# Patient Record
Sex: FEMALE | Race: WHITE | Employment: FULL TIME | URBAN - METROPOLITAN AREA
[De-identification: names, ages, dates, MRNs, and addresses within clinical notes are randomized per-mention and may not be internally consistent; named-entity substitution may affect disease eponyms.]

---

## 2017-05-22 ENCOUNTER — HOSPITAL ENCOUNTER (OUTPATIENT)
Dept: MAMMOGRAPHY | Age: 57
Discharge: HOME | End: 2017-05-22
Attending: FAMILY MEDICINE
Payer: COMMERCIAL

## 2017-05-22 DIAGNOSIS — Z12.31 VISIT FOR SCREENING MAMMOGRAM: ICD-10-CM

## 2017-05-22 PROCEDURE — 77067 SCR MAMMO BI INCL CAD: CPT

## 2017-09-16 ENCOUNTER — HOSPITAL ENCOUNTER (EMERGENCY)
Age: 57
Discharge: HOME | End: 2017-09-16
Attending: EMERGENCY MEDICINE
Payer: COMMERCIAL

## 2017-09-16 ENCOUNTER — APPOINTMENT (OUTPATIENT)
Dept: CT IMAGING | Age: 57
End: 2017-09-16
Payer: COMMERCIAL

## 2017-09-16 VITALS
HEART RATE: 64 BPM | OXYGEN SATURATION: 97 % | DIASTOLIC BLOOD PRESSURE: 67 MMHG | RESPIRATION RATE: 20 BRPM | BODY MASS INDEX: 19.93 KG/M2 | HEIGHT: 66 IN | TEMPERATURE: 98.1 F | SYSTOLIC BLOOD PRESSURE: 112 MMHG | WEIGHT: 124 LBS

## 2017-09-16 DIAGNOSIS — R20.2 PARESTHESIA: Primary | ICD-10-CM

## 2017-09-16 DIAGNOSIS — R51.9 NONINTRACTABLE HEADACHE, UNSPECIFIED CHRONICITY PATTERN, UNSPECIFIED HEADACHE TYPE: ICD-10-CM

## 2017-09-16 LAB
ALBUMIN SERPL-MCNC: 4 G/DL (ref 3.5–5)
ALBUMIN/GLOB SERPL: 1.1 {RATIO} (ref 1.2–3.5)
ALP SERPL-CCNC: 82 U/L (ref 50–136)
ALT SERPL-CCNC: 32 U/L (ref 12–65)
ANION GAP SERPL CALC-SCNC: 9 MMOL/L (ref 7–16)
AST SERPL-CCNC: 24 U/L (ref 15–37)
ATRIAL RATE: 61 BPM
BASOPHILS # BLD: 0 K/UL (ref 0–0.2)
BASOPHILS NFR BLD: 1 % (ref 0–2)
BILIRUB SERPL-MCNC: 0.8 MG/DL (ref 0.2–1.1)
BUN SERPL-MCNC: 15 MG/DL (ref 6–23)
CALCIUM SERPL-MCNC: 9.4 MG/DL (ref 8.3–10.4)
CALCULATED P AXIS, ECG09: 60 DEGREES
CALCULATED R AXIS, ECG10: -62 DEGREES
CALCULATED T AXIS, ECG11: 75 DEGREES
CHLORIDE SERPL-SCNC: 106 MMOL/L (ref 98–107)
CO2 SERPL-SCNC: 29 MMOL/L (ref 21–32)
CREAT SERPL-MCNC: 0.58 MG/DL (ref 0.6–1)
DIAGNOSIS, 93000: NORMAL
DIFFERENTIAL METHOD BLD: ABNORMAL
EOSINOPHIL # BLD: 0.1 K/UL (ref 0–0.8)
EOSINOPHIL NFR BLD: 1 % (ref 0.5–7.8)
ERYTHROCYTE [DISTWIDTH] IN BLOOD BY AUTOMATED COUNT: 13.4 % (ref 11.9–14.6)
GLOBULIN SER CALC-MCNC: 3.8 G/DL (ref 2.3–3.5)
GLUCOSE SERPL-MCNC: 100 MG/DL (ref 65–100)
HCT VFR BLD AUTO: 40.3 % (ref 35.8–46.3)
HGB BLD-MCNC: 14.6 G/DL (ref 11.7–15.4)
IMM GRANULOCYTES # BLD: 0 K/UL (ref 0–0.5)
IMM GRANULOCYTES NFR BLD: 0 % (ref 0–5)
LYMPHOCYTES # BLD: 1.7 K/UL (ref 0.5–4.6)
LYMPHOCYTES NFR BLD: 29 % (ref 13–44)
MCH RBC QN AUTO: 32.4 PG (ref 26.1–32.9)
MCHC RBC AUTO-ENTMCNC: 36.2 G/DL (ref 31.4–35)
MCV RBC AUTO: 89.4 FL (ref 79.6–97.8)
MONOCYTES # BLD: 0.3 K/UL (ref 0.1–1.3)
MONOCYTES NFR BLD: 5 % (ref 4–12)
NEUTS SEG # BLD: 3.8 K/UL (ref 1.7–8.2)
NEUTS SEG NFR BLD: 64 % (ref 43–78)
P-R INTERVAL, ECG05: 138 MS
PLATELET # BLD AUTO: 231 K/UL (ref 150–450)
PMV BLD AUTO: 9.7 FL (ref 10.8–14.1)
POTASSIUM SERPL-SCNC: 4.1 MMOL/L (ref 3.5–5.1)
PROT SERPL-MCNC: 7.8 G/DL (ref 6.3–8.2)
Q-T INTERVAL, ECG07: 434 MS
QRS DURATION, ECG06: 86 MS
QTC CALCULATION (BEZET), ECG08: 436 MS
RBC # BLD AUTO: 4.51 M/UL (ref 4.05–5.25)
SODIUM SERPL-SCNC: 144 MMOL/L (ref 136–145)
TROPONIN I SERPL-MCNC: <0.02 NG/ML (ref 0.02–0.05)
TSH SERPL DL<=0.005 MIU/L-ACNC: 0.88 UIU/ML (ref 0.36–3.74)
VENTRICULAR RATE, ECG03: 61 BPM
WBC # BLD AUTO: 5.9 K/UL (ref 4.3–11.1)

## 2017-09-16 PROCEDURE — 80053 COMPREHEN METABOLIC PANEL: CPT | Performed by: EMERGENCY MEDICINE

## 2017-09-16 PROCEDURE — 96361 HYDRATE IV INFUSION ADD-ON: CPT | Performed by: EMERGENCY MEDICINE

## 2017-09-16 PROCEDURE — 85025 COMPLETE CBC W/AUTO DIFF WBC: CPT | Performed by: EMERGENCY MEDICINE

## 2017-09-16 PROCEDURE — 84484 ASSAY OF TROPONIN QUANT: CPT | Performed by: EMERGENCY MEDICINE

## 2017-09-16 PROCEDURE — 93005 ELECTROCARDIOGRAM TRACING: CPT | Performed by: EMERGENCY MEDICINE

## 2017-09-16 PROCEDURE — 74011250636 HC RX REV CODE- 250/636: Performed by: EMERGENCY MEDICINE

## 2017-09-16 PROCEDURE — 96375 TX/PRO/DX INJ NEW DRUG ADDON: CPT | Performed by: EMERGENCY MEDICINE

## 2017-09-16 PROCEDURE — 84443 ASSAY THYROID STIM HORMONE: CPT | Performed by: EMERGENCY MEDICINE

## 2017-09-16 PROCEDURE — 96374 THER/PROPH/DIAG INJ IV PUSH: CPT | Performed by: EMERGENCY MEDICINE

## 2017-09-16 PROCEDURE — 70450 CT HEAD/BRAIN W/O DYE: CPT

## 2017-09-16 PROCEDURE — 99285 EMERGENCY DEPT VISIT HI MDM: CPT | Performed by: EMERGENCY MEDICINE

## 2017-09-16 RX ORDER — METOCLOPRAMIDE HYDROCHLORIDE 5 MG/ML
10 INJECTION INTRAMUSCULAR; INTRAVENOUS
Status: COMPLETED | OUTPATIENT
Start: 2017-09-16 | End: 2017-09-16

## 2017-09-16 RX ORDER — SODIUM CHLORIDE 9 MG/ML
1000 INJECTION, SOLUTION INTRAVENOUS ONCE
Status: COMPLETED | OUTPATIENT
Start: 2017-09-16 | End: 2017-09-16

## 2017-09-16 RX ORDER — KETOROLAC TROMETHAMINE 30 MG/ML
15 INJECTION, SOLUTION INTRAMUSCULAR; INTRAVENOUS
Status: COMPLETED | OUTPATIENT
Start: 2017-09-16 | End: 2017-09-16

## 2017-09-16 RX ORDER — DIPHENHYDRAMINE HYDROCHLORIDE 50 MG/ML
25 INJECTION, SOLUTION INTRAMUSCULAR; INTRAVENOUS
Status: COMPLETED | OUTPATIENT
Start: 2017-09-16 | End: 2017-09-16

## 2017-09-16 RX ADMIN — DIPHENHYDRAMINE HYDROCHLORIDE 25 MG: 50 INJECTION, SOLUTION INTRAMUSCULAR; INTRAVENOUS at 16:02

## 2017-09-16 RX ADMIN — METOCLOPRAMIDE 10 MG: 5 INJECTION, SOLUTION INTRAMUSCULAR; INTRAVENOUS at 16:02

## 2017-09-16 RX ADMIN — KETOROLAC TROMETHAMINE 15 MG: 30 INJECTION, SOLUTION INTRAMUSCULAR at 16:01

## 2017-09-16 RX ADMIN — SODIUM CHLORIDE 1000 ML: 900 INJECTION, SOLUTION INTRAVENOUS at 16:01

## 2017-09-16 NOTE — ED NOTES
Rosey Benavides is a 56 y.o. female here for headache, dizziness, decreased taste sensation. Rapid assessment performed. --- Orders were placed. --- Patient will be roomed. I have seen and briefly evaluated the patient in triage in order to expedite their workup and treatment as defined by the department policy and procedure. Their care will be completed in the emergency department by another provider. The work up will not be complete until this further evaluation is completed by another provider.      Signed By: LUIS Shaver     September 16, 2017

## 2017-09-16 NOTE — DISCHARGE INSTRUCTIONS
Numbness and Tingling: Care Instructions  Your Care Instructions  Many things can cause numbness or tingling. Swelling may put pressure on a nerve. This could cause you to lose feeling or have a pins-and-needles sensation on part of your body. Nerves may be damaged from trauma, toxins, or diseases, such as diabetes or multiple sclerosis (MS). Sometimes, though, the cause is not clear. If there is no clear reason for your symptoms, and you are not having any other symptoms, your doctor may suggest watching and waiting for a while to see if the numbness or tingling goes away on its own. Your doctor may want you to have blood or nerve tests to find the cause of your symptoms. Follow-up care is a key part of your treatment and safety. Be sure to make and go to all appointments, and call your doctor if you are having problems. It's also a good idea to know your test results and keep a list of the medicines you take. How can you care for yourself at home? · If your doctor prescribes medicine, take it exactly as directed. Call your doctor if you think you are having a problem with your medicine. · If you have any swelling, put ice or a cold pack on the area for 10 to 20 minutes at a time. Put a thin cloth between the ice and your skin. When should you call for help? Call 911 anytime you think you may need emergency care. For example, call if:  · You have weakness, numbness, or tingling in both legs. · You lose bowel or bladder control. · You have symptoms of a stroke. These may include:  ¨ Sudden numbness, tingling, weakness, or loss of movement in your face, arm, or leg, especially on only one side of your body. ¨ Sudden vision changes. ¨ Sudden trouble speaking. ¨ Sudden confusion or trouble understanding simple statements. ¨ Sudden problems with walking or balance. ¨ A sudden, severe headache that is different from past headaches.   Watch closely for changes in your health, and be sure to contact your doctor if you have any problems, or if:  · You do not get better as expected. Where can you learn more? Go to http://betty-evelin.info/. Enter H948 in the search box to learn more about \"Numbness and Tingling: Care Instructions. \"  Current as of: October 14, 2016  Content Version: 11.3  © 7949-4305 "[employer]. Care instructions adapted under license by [employer] (which disclaims liability or warranty for this information). If you have questions about a medical condition or this instruction, always ask your healthcare professional. [place] [address] any warranty or liability for your use of this information.

## 2017-09-16 NOTE — ED PROVIDER NOTES
Patient is a 56 y.o. female presenting with dizziness and headaches. The history is provided by the patient. Dizziness   This is a new problem. The current episode started more than 1 week ago. The problem has not changed since onset. There was no focality noted. Pertinent negatives include no focal weakness, no slurred speech, no speech difficulty, no visual change, no mental status change and no unresponsiveness. There has been no fever. Associated symptoms include headaches. Pertinent negatives include no chest pain. Associated medical issues do not include trauma or seizures. Headache    Associated symptoms include dizziness. Pertinent negatives include no fever and no visual change. No past medical history on file. Past Surgical History:   Procedure Laterality Date    VIDA BIOPSY BREAST STEREOTACTIC Left 2014         Family History:   Problem Relation Age of Onset    Breast Cancer Mother 79    Breast Cancer Sister 58       Social History     Social History    Marital status:      Spouse name: N/A    Number of children: N/A    Years of education: N/A     Occupational History    Not on file. Social History Main Topics    Smoking status: Not on file    Smokeless tobacco: Not on file    Alcohol use Not on file    Drug use: Not on file    Sexual activity: Not on file     Other Topics Concern    Not on file     Social History Narrative         ALLERGIES: Review of patient's allergies indicates no known allergies. Review of Systems   Constitutional: Negative. Negative for chills and fever. HENT: Positive for hearing loss. Negative for congestion, ear pain, postnasal drip and rhinorrhea. Eyes: Negative for pain and visual disturbance. Respiratory: Negative for cough and wheezing. Cardiovascular: Negative for chest pain and leg swelling. Gastrointestinal: Negative. Negative for abdominal distention and abdominal pain. Endocrine: Negative.   Negative for polydipsia, polyphagia and polyuria. Genitourinary: Negative. Negative for difficulty urinating, flank pain and frequency. Musculoskeletal: Negative. Negative for arthralgias and myalgias. Skin: Negative. Neurological: Positive for dizziness and headaches. Negative for focal weakness and speech difficulty. Hematological: Negative. Vitals:    09/16/17 1406 09/16/17 1430 09/16/17 1454 09/16/17 1545   BP: 114/83  123/56    Pulse: 78 64 60 60   Resp: 16 24  (!) 33   Temp: 98.9 °F (37.2 °C)      SpO2: 95%  97% 96%   Weight: 56.2 kg (124 lb)      Height: 5' 6\" (1.676 m)               Physical Exam   Constitutional: She is oriented to person, place, and time. She appears well-developed and well-nourished. Non-toxic appearance. She does not have a sickly appearance. She does not appear ill. No distress. HENT:   Head: Normocephalic and atraumatic. Cardiovascular: Intact distal pulses. Pulmonary/Chest: Effort normal.   Abdominal: Soft. Neurological: She is alert and oriented to person, place, and time. Skin: Skin is warm and dry. Psychiatric: She has a normal mood and affect. Her behavior is normal.   Nursing note and vitals reviewed. MDM  Number of Diagnoses or Management Options  Nonintractable headache, unspecified chronicity pattern, unspecified headache type: new and requires workup  Paresthesia: new and requires workup  Diagnosis management comments: Patient has no focal neurologic signs in the ER. Symptoms present for 14 days but she states she has not contacted her primary care physician since she is not due for a physical anytime soon. Patient states she came to the ER only because the symptoms have not resolved and she was concerned why she's had headaches, numbness in her throat, and bilateral upper extremity tingling in her fingertips for that period of time.   Patient states that she recently tapered herself off of 4-5 years of chronic benzodiazepine therapy and wasn't sure if that could be related. Denies fever or chills. Denies other complaints currently. Severity and other in the room states that she does take a lot of over-the-counter supplementations but does not have a list currently. Patient states she does take Diovan and Synthroid chronically otherwise no other medications are prescribed.            Amount and/or Complexity of Data Reviewed  Clinical lab tests: ordered and reviewed (Results for orders placed or performed during the hospital encounter of 09/16/17  -CBC WITH AUTOMATED DIFF       Result                                            Value                         Ref Range                       WBC                                               5.9                           4.3 - 11.1 K/uL                 RBC                                               4.51                          4.05 - 5.25 M/uL                HGB                                               14.6                          11.7 - 15.4 g/dL                HCT                                               40.3                          35.8 - 46.3 %                   MCV                                               89.4                          79.6 - 97.8 FL                  MCH                                               32.4                          26.1 - 32.9 PG                  MCHC                                              36.2 (H)                      31.4 - 35.0 g/dL                RDW                                               13.4                          11.9 - 14.6 %                   PLATELET                                          231                           150 - 450 K/uL                  MPV                                               9.7 (L)                       10.8 - 14.1 FL                  DF                                                AUTOMATED                                                     NEUTROPHILS                                       64 43 - 78 %                       LYMPHOCYTES                                       29                            13 - 44 %                       MONOCYTES                                         5                             4.0 - 12.0 %                    EOSINOPHILS                                       1                             0.5 - 7.8 %                     BASOPHILS                                         1                             0.0 - 2.0 %                     IMMATURE GRANULOCYTES                             0.0                           0.0 - 5.0 %                     ABS. NEUTROPHILS                                  3.8                           1.7 - 8.2 K/UL                  ABS. LYMPHOCYTES                                  1.7                           0.5 - 4.6 K/UL                  ABS. MONOCYTES                                    0.3                           0.1 - 1.3 K/UL                  ABS. EOSINOPHILS                                  0.1                           0.0 - 0.8 K/UL                  ABS. BASOPHILS                                    0.0                           0.0 - 0.2 K/UL                  ABS. IMM.  GRANS.                                  0.0                           0.0 - 0.5 K/UL             -METABOLIC PANEL, COMPREHENSIVE       Result                                            Value                         Ref Range                       Sodium                                            144                           136 - 145 mmol/L                Potassium                                         4.1                           3.5 - 5.1 mmol/L                Chloride                                          106                           98 - 107 mmol/L                 CO2                                               29                            21 - 32 mmol/L                  Anion gap                                         9                             7 - 16 mmol/L                   Glucose                                           100                           65 - 100 mg/dL                  BUN                                               15                            6 - 23 MG/DL                    Creatinine                                        0.58 (L)                      0.6 - 1.0 MG/DL                 GFR est AA                                        >60                           >60 ml/min/1.73m2               GFR est non-AA                                    >60                           >60 ml/min/1.73m2               Calcium                                           9.4                           8.3 - 10.4 MG/DL                Bilirubin, total                                  0.8                           0.2 - 1.1 MG/DL                 ALT (SGPT)                                        32                            12 - 65 U/L                     AST (SGOT)                                        24                            15 - 37 U/L                     Alk. phosphatase                                  82                            50 - 136 U/L                    Protein, total                                    7.8                           6.3 - 8.2 g/dL                  Albumin                                           4.0                           3.5 - 5.0 g/dL                  Globulin                                          3.8 (H)                       2.3 - 3.5 g/dL                  A-G Ratio                                         1.1 (L)                       1.2 - 3.5                  -TROPONIN I       Result                                            Value                         Ref Range                       Troponin-I, Qt.                                   <0.02 (L)                     0.02 - 0.05 NG/ML          -EKG, 12 LEAD, INITIAL       Result                                            Value                         Ref Range Ventricular Rate                                  61                            BPM                             Atrial Rate                                       61                            BPM                             P-R Interval                                      138                           ms                              QRS Duration                                      86                            ms                              Q-T Interval                                      434                           ms                              QTC Calculation (Bezet)                           436                           ms                              Calculated P Axis                                 60                            degrees                         Calculated R Axis                                 -62                           degrees                         Calculated T Axis                                 75                            degrees                         Diagnosis                                                                                                   !! AGE AND GENDER SPECIFIC ECG ANALYSIS !! Normal sinus rhythm   Left axis deviation   RSR' or QR pattern in V1 suggests right ventricular conduction delay   Abnormal ECG   When compared with ECG of 30-OCT-2016 13:08,   No significant change was found     )  Tests in the radiology section of CPT®: ordered and reviewed (Ct Head Wo Cont    Result Date: 9/16/2017  NONCONTRAST HEAD CT CLINICAL HISTORY:  HEAD injury from syncopal fall. COMPARISON:  October 30, 2016. REPORT:   Standard non contrast head CT demonstrates no definite intracranial mass effect, hemorrhage, or evidence of acute geographic infarction. The ventricles are normal in size and configuration, accounting for the patient's age. Orbits and  paranasal sinuses are clear where imaged.  Bone windows demonstrate no definite fracture or destruction.      IMPRESSION:     NO ACUTE INTRACRANIAL ABNORMALITY OR CALVARIAL FRACTURE IDENTIFIED AT NONCONTRAST CT.     )    Risk of Complications, Morbidity, and/or Mortality  Presenting problems: moderate  Management options: moderate      ED Course       Procedures

## 2017-09-16 NOTE — ED NOTES
I have reviewed discharge instructions with the patient. The patient verbalized understanding. Patient left ED via Discharge Method: ambulatory to Home with (self and ). Opportunity for questions and clarification provided. Patient given 0 scripts.

## 2018-05-23 ENCOUNTER — HOSPITAL ENCOUNTER (OUTPATIENT)
Dept: MAMMOGRAPHY | Age: 58
Discharge: HOME | End: 2018-05-23
Attending: INTERNAL MEDICINE
Payer: COMMERCIAL

## 2018-05-23 DIAGNOSIS — Z12.31 VISIT FOR SCREENING MAMMOGRAM: ICD-10-CM

## 2018-05-23 PROCEDURE — 77067 SCR MAMMO BI INCL CAD: CPT

## 2019-05-24 ENCOUNTER — HOSPITAL ENCOUNTER (OUTPATIENT)
Dept: MAMMOGRAPHY | Age: 59
Discharge: HOME | End: 2019-05-24
Attending: INTERNAL MEDICINE
Payer: COMMERCIAL

## 2019-05-24 DIAGNOSIS — Z12.31 VISIT FOR SCREENING MAMMOGRAM: ICD-10-CM

## 2019-05-24 PROCEDURE — 77067 SCR MAMMO BI INCL CAD: CPT

## 2020-05-29 ENCOUNTER — HOSPITAL ENCOUNTER (OUTPATIENT)
Dept: MAMMOGRAPHY | Age: 60
Discharge: HOME | End: 2020-05-29
Attending: INTERNAL MEDICINE
Payer: COMMERCIAL

## 2020-05-29 DIAGNOSIS — Z12.31 VISIT FOR SCREENING MAMMOGRAM: ICD-10-CM

## 2020-05-29 PROCEDURE — 77067 SCR MAMMO BI INCL CAD: CPT

## 2021-03-11 ENCOUNTER — TRANSCRIBE ORDER (OUTPATIENT)
Dept: SCHEDULING | Age: 61
End: 2021-03-11

## 2021-03-11 DIAGNOSIS — Z12.31 SCREENING MAMMOGRAM FOR HIGH-RISK PATIENT: Primary | ICD-10-CM

## 2021-06-11 ENCOUNTER — HOSPITAL ENCOUNTER (OUTPATIENT)
Dept: MAMMOGRAPHY | Age: 61
Discharge: HOME | End: 2021-06-11
Attending: INTERNAL MEDICINE
Payer: COMMERCIAL

## 2021-06-11 DIAGNOSIS — Z12.31 SCREENING MAMMOGRAM FOR HIGH-RISK PATIENT: ICD-10-CM

## 2021-06-11 PROCEDURE — 77067 SCR MAMMO BI INCL CAD: CPT

## 2021-07-16 ENCOUNTER — HOSPITAL ENCOUNTER (OUTPATIENT)
Dept: MAMMOGRAPHY | Age: 61
Discharge: HOME | End: 2021-07-16
Attending: INTERNAL MEDICINE
Payer: COMMERCIAL

## 2021-07-16 DIAGNOSIS — R92.8 ABNORMAL SCREENING MAMMOGRAM: ICD-10-CM

## 2021-07-16 PROCEDURE — 77065 DX MAMMO INCL CAD UNI: CPT

## 2021-07-30 ENCOUNTER — HOSPITAL ENCOUNTER (OUTPATIENT)
Dept: MAMMOGRAPHY | Age: 61
Discharge: HOME | End: 2021-07-30
Attending: INTERNAL MEDICINE
Payer: COMMERCIAL

## 2021-07-30 DIAGNOSIS — R92.8 ABNORMAL MAMMOGRAM OF RIGHT BREAST: ICD-10-CM

## 2021-07-30 PROCEDURE — 19081 BX BREAST 1ST LESION STRTCTC: CPT

## 2021-07-30 PROCEDURE — 77065 DX MAMMO INCL CAD UNI: CPT

## 2021-07-30 PROCEDURE — 74011000250 HC RX REV CODE- 250

## 2021-07-30 PROCEDURE — 74011250636 HC RX REV CODE- 250/636

## 2021-07-30 PROCEDURE — 88305 TISSUE EXAM BY PATHOLOGIST: CPT

## 2021-07-30 RX ORDER — LIDOCAINE HYDROCHLORIDE 10 MG/ML
5 INJECTION INFILTRATION; PERINEURAL
Status: COMPLETED | OUTPATIENT
Start: 2021-07-30 | End: 2021-07-30

## 2021-07-30 RX ORDER — LIDOCAINE HYDROCHLORIDE AND EPINEPHRINE 10; 10 MG/ML; UG/ML
15 INJECTION, SOLUTION INFILTRATION; PERINEURAL
Status: COMPLETED | OUTPATIENT
Start: 2021-07-30 | End: 2021-07-30

## 2021-07-30 RX ADMIN — LIDOCAINE HYDROCHLORIDE 5 ML: 10 INJECTION, SOLUTION INFILTRATION; PERINEURAL at 10:27

## 2021-07-30 RX ADMIN — LIDOCAINE HYDROCHLORIDE,EPINEPHRINE BITARTRATE 150 MG: 10; .01 INJECTION, SOLUTION INFILTRATION; PERINEURAL at 10:28

## 2021-07-30 RX ADMIN — SODIUM CHLORIDE 250 ML: 900 INJECTION, SOLUTION INTRAVENOUS at 10:28

## 2021-08-10 NOTE — PROGRESS NOTES
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 Dr. Mary Quan and I discussed the next steps as well as her results, she has a hearing impairment therefore she reads lips. She will have a breast MRI on 8-26-21 and then follow up with Dr. Biis Kwong on 8-31-21. The patient did receive a new breast cancer patient packet of information that includes her results as well as her appointments and our contact information in case she has any further questions.

## 2021-08-26 ENCOUNTER — HOSPITAL ENCOUNTER (OUTPATIENT)
Dept: MRI IMAGING | Age: 61
Discharge: HOME | End: 2021-08-26
Attending: INTERNAL MEDICINE
Payer: COMMERCIAL

## 2021-08-26 DIAGNOSIS — C50.911 INVASIVE DUCTAL CARCINOMA OF BREAST, RIGHT (HCC): ICD-10-CM

## 2021-08-26 PROCEDURE — A9576 INJ PROHANCE MULTIPACK: HCPCS

## 2021-08-26 PROCEDURE — 74011250636 HC RX REV CODE- 250/636

## 2021-08-26 PROCEDURE — 77049 MRI BREAST C-+ W/CAD BI: CPT

## 2021-08-26 RX ORDER — SODIUM CHLORIDE 0.9 % (FLUSH) 0.9 %
10 SYRINGE (ML) INJECTION
Status: COMPLETED | OUTPATIENT
Start: 2021-08-26 | End: 2021-08-26

## 2021-08-26 RX ADMIN — Medication 10 ML: at 08:43

## 2021-08-26 RX ADMIN — GADOTERIDOL 15 ML: 279.3 INJECTION, SOLUTION INTRAVENOUS at 08:43

## 2021-10-13 ENCOUNTER — ANESTHESIA EVENT (OUTPATIENT)
Dept: SURGERY | Age: 61
End: 2021-10-13
Payer: COMMERCIAL

## 2021-10-14 ENCOUNTER — HOSPITAL ENCOUNTER (OUTPATIENT)
Age: 61
Setting detail: OBSERVATION
Discharge: HOME | End: 2021-10-15
Attending: SURGERY | Admitting: PLASTIC SURGERY
Payer: COMMERCIAL

## 2021-10-14 ENCOUNTER — APPOINTMENT (OUTPATIENT)
Dept: NUCLEAR MEDICINE | Age: 61
End: 2021-10-14
Attending: SURGERY
Payer: COMMERCIAL

## 2021-10-14 ENCOUNTER — ANESTHESIA (OUTPATIENT)
Dept: SURGERY | Age: 61
End: 2021-10-14
Payer: COMMERCIAL

## 2021-10-14 DIAGNOSIS — Z80.3 FAMILY HISTORY OF BREAST CANCER: ICD-10-CM

## 2021-10-14 DIAGNOSIS — N64.89 BREAST ASYMMETRY: ICD-10-CM

## 2021-10-14 DIAGNOSIS — C50.911 DUCTAL CARCINOMA OF BREAST, RIGHT (HCC): ICD-10-CM

## 2021-10-14 PROBLEM — C50.919 BREAST CANCER (HCC): Status: ACTIVE | Noted: 2021-10-14

## 2021-10-14 PROCEDURE — C1789 PROSTHESIS, BREAST, IMP: HCPCS | Performed by: SURGERY

## 2021-10-14 PROCEDURE — 99218 HC RM OBSERVATION: CPT

## 2021-10-14 PROCEDURE — 77030008462 HC STPLR SKN PROX J&J -A: Performed by: SURGERY

## 2021-10-14 PROCEDURE — 88307 TISSUE EXAM BY PATHOLOGIST: CPT

## 2021-10-14 PROCEDURE — 74011250636 HC RX REV CODE- 250/636: Performed by: PLASTIC SURGERY

## 2021-10-14 PROCEDURE — 74011250636 HC RX REV CODE- 250/636: Performed by: SURGERY

## 2021-10-14 PROCEDURE — 77030040361 HC SLV COMPR DVT MDII -B: Performed by: SURGERY

## 2021-10-14 PROCEDURE — 77030011825 HC SUPP SURG PSTOP S2SG -B: Performed by: SURGERY

## 2021-10-14 PROCEDURE — 76210000016 HC OR PH I REC 1 TO 1.5 HR: Performed by: SURGERY

## 2021-10-14 PROCEDURE — 74011250637 HC RX REV CODE- 250/637: Performed by: PLASTIC SURGERY

## 2021-10-14 PROCEDURE — 77030038552 HC DRN WND MDII -A: Performed by: SURGERY

## 2021-10-14 PROCEDURE — 76010000174 HC OR TIME 3.5 TO 4 HR INTENSV-TIER 1: Performed by: SURGERY

## 2021-10-14 PROCEDURE — 2709999900 HC NON-CHARGEABLE SUPPLY: Performed by: SURGERY

## 2021-10-14 PROCEDURE — 74011000250 HC RX REV CODE- 250: Performed by: STUDENT IN AN ORGANIZED HEALTH CARE EDUCATION/TRAINING PROGRAM

## 2021-10-14 PROCEDURE — 77030011265 HC ELECTRD BLD HEX COVD -A: Performed by: SURGERY

## 2021-10-14 PROCEDURE — 77030005325: Performed by: SURGERY

## 2021-10-14 PROCEDURE — 77030039425 HC BLD LARYNG TRULITE DISP TELE -A: Performed by: STUDENT IN AN ORGANIZED HEALTH CARE EDUCATION/TRAINING PROGRAM

## 2021-10-14 PROCEDURE — 77030002933 HC SUT MCRYL J&J -A: Performed by: SURGERY

## 2021-10-14 PROCEDURE — 19303 MAST SIMPLE COMPLETE: CPT | Performed by: SURGERY

## 2021-10-14 PROCEDURE — 77030019908 HC STETH ESOPH SIMS -A: Performed by: STUDENT IN AN ORGANIZED HEALTH CARE EDUCATION/TRAINING PROGRAM

## 2021-10-14 PROCEDURE — 77030013567 HC DRN WND RESERV BARD -A: Performed by: SURGERY

## 2021-10-14 PROCEDURE — 77030040922 HC BLNKT HYPOTHRM STRY -A: Performed by: STUDENT IN AN ORGANIZED HEALTH CARE EDUCATION/TRAINING PROGRAM

## 2021-10-14 PROCEDURE — 77030002916 HC SUT ETHLN J&J -A: Performed by: SURGERY

## 2021-10-14 PROCEDURE — 74011000636 HC RX REV CODE- 636: Performed by: SURGERY

## 2021-10-14 PROCEDURE — 77030026227 HC FUNL KELLR 2 PCH ALGN -C: Performed by: SURGERY

## 2021-10-14 PROCEDURE — A9541 TC99M SULFUR COLLOID: HCPCS

## 2021-10-14 PROCEDURE — 38525 BIOPSY/REMOVAL LYMPH NODES: CPT | Performed by: SURGERY

## 2021-10-14 PROCEDURE — 77030011264 HC ELECTRD BLD EXT COVD -A: Performed by: SURGERY

## 2021-10-14 PROCEDURE — 76060000038 HC ANESTHESIA 3.5 TO 4 HR: Performed by: SURGERY

## 2021-10-14 PROCEDURE — 77030037088 HC TUBE ENDOTRACH ORAL NSL COVD-A: Performed by: STUDENT IN AN ORGANIZED HEALTH CARE EDUCATION/TRAINING PROGRAM

## 2021-10-14 PROCEDURE — 88309 TISSUE EXAM BY PATHOLOGIST: CPT

## 2021-10-14 PROCEDURE — 74011000272 HC RX REV CODE- 272: Performed by: SURGERY

## 2021-10-14 PROCEDURE — 74011000250 HC RX REV CODE- 250: Performed by: PLASTIC SURGERY

## 2021-10-14 PROCEDURE — 77030031139 HC SUT VCRL2 J&J -A: Performed by: SURGERY

## 2021-10-14 PROCEDURE — 77030010514 HC APPL CLP LIG COVD -B: Performed by: SURGERY

## 2021-10-14 PROCEDURE — 88361 TUMOR IMMUNOHISTOCHEM/COMPUT: CPT

## 2021-10-14 PROCEDURE — 74011250636 HC RX REV CODE- 250/636: Performed by: STUDENT IN AN ORGANIZED HEALTH CARE EDUCATION/TRAINING PROGRAM

## 2021-10-14 DEVICE — TEXTURED, ULTRA HIGH PROFILE, SUTURE TABS, INTEGRAL INJECTION DOME 455CC
Type: IMPLANTABLE DEVICE | Site: BREAST | Status: FUNCTIONAL
Brand: ARTOURA BREAST TISSUE EXPANDER

## 2021-10-14 RX ORDER — CEFAZOLIN SODIUM/WATER 2 G/20 ML
2 SYRINGE (ML) INTRAVENOUS
Status: COMPLETED | OUTPATIENT
Start: 2021-10-14 | End: 2021-10-14

## 2021-10-14 RX ORDER — DEXTROSE, SODIUM CHLORIDE, AND POTASSIUM CHLORIDE 5; .45; .15 G/100ML; G/100ML; G/100ML
75 INJECTION INTRAVENOUS CONTINUOUS
Status: DISCONTINUED | OUTPATIENT
Start: 2021-10-14 | End: 2021-10-15 | Stop reason: HOSPADM

## 2021-10-14 RX ORDER — LIDOCAINE HYDROCHLORIDE 20 MG/ML
INJECTION, SOLUTION EPIDURAL; INFILTRATION; INTRACAUDAL; PERINEURAL AS NEEDED
Status: DISCONTINUED | OUTPATIENT
Start: 2021-10-14 | End: 2021-10-14 | Stop reason: HOSPADM

## 2021-10-14 RX ORDER — METHYLENE BLUE 10 MG/ML
INJECTION INTRAVENOUS AS NEEDED
Status: DISCONTINUED | OUTPATIENT
Start: 2021-10-14 | End: 2021-10-14 | Stop reason: HOSPADM

## 2021-10-14 RX ORDER — OXYCODONE HYDROCHLORIDE 5 MG/1
5 TABLET ORAL
Status: DISCONTINUED | OUTPATIENT
Start: 2021-10-14 | End: 2021-10-14 | Stop reason: HOSPADM

## 2021-10-14 RX ORDER — SODIUM CHLORIDE 0.9 % (FLUSH) 0.9 %
5-40 SYRINGE (ML) INJECTION EVERY 8 HOURS
Status: DISCONTINUED | OUTPATIENT
Start: 2021-10-14 | End: 2021-10-14 | Stop reason: HOSPADM

## 2021-10-14 RX ORDER — MIDAZOLAM HYDROCHLORIDE 1 MG/ML
2 INJECTION, SOLUTION INTRAMUSCULAR; INTRAVENOUS
Status: DISCONTINUED | OUTPATIENT
Start: 2021-10-14 | End: 2021-10-14 | Stop reason: HOSPADM

## 2021-10-14 RX ORDER — LIDOCAINE HYDROCHLORIDE 10 MG/ML
0.1 INJECTION INFILTRATION; PERINEURAL AS NEEDED
Status: DISCONTINUED | OUTPATIENT
Start: 2021-10-14 | End: 2021-10-14 | Stop reason: HOSPADM

## 2021-10-14 RX ORDER — EPHEDRINE SULFATE/0.9% NACL/PF 50 MG/5 ML
SYRINGE (ML) INTRAVENOUS AS NEEDED
Status: DISCONTINUED | OUTPATIENT
Start: 2021-10-14 | End: 2021-10-14 | Stop reason: HOSPADM

## 2021-10-14 RX ORDER — KETOROLAC TROMETHAMINE 30 MG/ML
30 INJECTION, SOLUTION INTRAMUSCULAR; INTRAVENOUS
Status: COMPLETED | OUTPATIENT
Start: 2021-10-14 | End: 2021-10-14

## 2021-10-14 RX ORDER — NEOSTIGMINE METHYLSULFATE 1 MG/ML
INJECTION, SOLUTION INTRAVENOUS AS NEEDED
Status: DISCONTINUED | OUTPATIENT
Start: 2021-10-14 | End: 2021-10-14 | Stop reason: HOSPADM

## 2021-10-14 RX ORDER — BUPIVACAINE HYDROCHLORIDE 5 MG/ML
INJECTION, SOLUTION EPIDURAL; INTRACAUDAL AS NEEDED
Status: DISCONTINUED | OUTPATIENT
Start: 2021-10-14 | End: 2021-10-14 | Stop reason: HOSPADM

## 2021-10-14 RX ORDER — DIPHENHYDRAMINE HYDROCHLORIDE 50 MG/ML
12.5 INJECTION, SOLUTION INTRAMUSCULAR; INTRAVENOUS
Status: DISCONTINUED | OUTPATIENT
Start: 2021-10-14 | End: 2021-10-15 | Stop reason: HOSPADM

## 2021-10-14 RX ORDER — SODIUM CHLORIDE, SODIUM LACTATE, POTASSIUM CHLORIDE, CALCIUM CHLORIDE 600; 310; 30; 20 MG/100ML; MG/100ML; MG/100ML; MG/100ML
100 INJECTION, SOLUTION INTRAVENOUS CONTINUOUS
Status: DISCONTINUED | OUTPATIENT
Start: 2021-10-14 | End: 2021-10-14 | Stop reason: HOSPADM

## 2021-10-14 RX ORDER — PROPOFOL 10 MG/ML
INJECTION, EMULSION INTRAVENOUS AS NEEDED
Status: DISCONTINUED | OUTPATIENT
Start: 2021-10-14 | End: 2021-10-14 | Stop reason: HOSPADM

## 2021-10-14 RX ORDER — SODIUM CHLORIDE 0.9 % (FLUSH) 0.9 %
5-40 SYRINGE (ML) INJECTION EVERY 8 HOURS
Status: DISCONTINUED | OUTPATIENT
Start: 2021-10-14 | End: 2021-10-15 | Stop reason: HOSPADM

## 2021-10-14 RX ORDER — DEXAMETHASONE SODIUM PHOSPHATE 4 MG/ML
INJECTION, SOLUTION INTRA-ARTICULAR; INTRALESIONAL; INTRAMUSCULAR; INTRAVENOUS; SOFT TISSUE AS NEEDED
Status: DISCONTINUED | OUTPATIENT
Start: 2021-10-14 | End: 2021-10-14 | Stop reason: HOSPADM

## 2021-10-14 RX ORDER — MORPHINE SULFATE 2 MG/ML
2 INJECTION, SOLUTION INTRAMUSCULAR; INTRAVENOUS
Status: DISCONTINUED | OUTPATIENT
Start: 2021-10-14 | End: 2021-10-15 | Stop reason: HOSPADM

## 2021-10-14 RX ORDER — FLUOXETINE HYDROCHLORIDE 20 MG/1
40 CAPSULE ORAL DAILY
Status: DISCONTINUED | OUTPATIENT
Start: 2021-10-15 | End: 2021-10-15 | Stop reason: HOSPADM

## 2021-10-14 RX ORDER — SODIUM CHLORIDE 0.9 % (FLUSH) 0.9 %
5-40 SYRINGE (ML) INJECTION AS NEEDED
Status: DISCONTINUED | OUTPATIENT
Start: 2021-10-14 | End: 2021-10-14 | Stop reason: HOSPADM

## 2021-10-14 RX ORDER — HYDROCODONE BITARTRATE AND ACETAMINOPHEN 7.5; 325 MG/1; MG/1
2 TABLET ORAL
Status: DISCONTINUED | OUTPATIENT
Start: 2021-10-14 | End: 2021-10-15 | Stop reason: HOSPADM

## 2021-10-14 RX ORDER — ENOXAPARIN SODIUM 100 MG/ML
40 INJECTION SUBCUTANEOUS EVERY 24 HOURS
Status: DISCONTINUED | OUTPATIENT
Start: 2021-10-15 | End: 2021-10-15 | Stop reason: HOSPADM

## 2021-10-14 RX ORDER — LEVOTHYROXINE AND LIOTHYRONINE 38; 9 UG/1; UG/1
30 TABLET ORAL DAILY
Status: DISCONTINUED | OUTPATIENT
Start: 2021-10-15 | End: 2021-10-15 | Stop reason: HOSPADM

## 2021-10-14 RX ORDER — FENTANYL CITRATE 50 UG/ML
INJECTION, SOLUTION INTRAMUSCULAR; INTRAVENOUS AS NEEDED
Status: DISCONTINUED | OUTPATIENT
Start: 2021-10-14 | End: 2021-10-14 | Stop reason: HOSPADM

## 2021-10-14 RX ORDER — ONDANSETRON 2 MG/ML
INJECTION INTRAMUSCULAR; INTRAVENOUS AS NEEDED
Status: DISCONTINUED | OUTPATIENT
Start: 2021-10-14 | End: 2021-10-14 | Stop reason: HOSPADM

## 2021-10-14 RX ORDER — KETAMINE HYDROCHLORIDE 50 MG/ML
INJECTION, SOLUTION INTRAMUSCULAR; INTRAVENOUS AS NEEDED
Status: DISCONTINUED | OUTPATIENT
Start: 2021-10-14 | End: 2021-10-14 | Stop reason: HOSPADM

## 2021-10-14 RX ORDER — ONDANSETRON 2 MG/ML
4 INJECTION INTRAMUSCULAR; INTRAVENOUS
Status: DISCONTINUED | OUTPATIENT
Start: 2021-10-14 | End: 2021-10-15 | Stop reason: HOSPADM

## 2021-10-14 RX ORDER — HYDROMORPHONE HYDROCHLORIDE 2 MG/ML
INJECTION, SOLUTION INTRAMUSCULAR; INTRAVENOUS; SUBCUTANEOUS AS NEEDED
Status: DISCONTINUED | OUTPATIENT
Start: 2021-10-14 | End: 2021-10-14 | Stop reason: HOSPADM

## 2021-10-14 RX ORDER — LEVOTHYROXINE AND LIOTHYRONINE 38; 9 UG/1; UG/1
30 TABLET ORAL DAILY
Status: DISCONTINUED | OUTPATIENT
Start: 2021-10-15 | End: 2021-10-14 | Stop reason: SDUPTHER

## 2021-10-14 RX ORDER — GLYCOPYRROLATE 0.2 MG/ML
INJECTION INTRAMUSCULAR; INTRAVENOUS AS NEEDED
Status: DISCONTINUED | OUTPATIENT
Start: 2021-10-14 | End: 2021-10-14 | Stop reason: HOSPADM

## 2021-10-14 RX ORDER — NALOXONE HYDROCHLORIDE 0.4 MG/ML
0.4 INJECTION, SOLUTION INTRAMUSCULAR; INTRAVENOUS; SUBCUTANEOUS AS NEEDED
Status: DISCONTINUED | OUTPATIENT
Start: 2021-10-14 | End: 2021-10-15 | Stop reason: HOSPADM

## 2021-10-14 RX ORDER — VALACYCLOVIR HYDROCHLORIDE 500 MG/1
500 TABLET, FILM COATED ORAL DAILY
Status: DISCONTINUED | OUTPATIENT
Start: 2021-10-15 | End: 2021-10-14

## 2021-10-14 RX ORDER — ROCURONIUM BROMIDE 10 MG/ML
INJECTION, SOLUTION INTRAVENOUS AS NEEDED
Status: DISCONTINUED | OUTPATIENT
Start: 2021-10-14 | End: 2021-10-14 | Stop reason: HOSPADM

## 2021-10-14 RX ORDER — HYDROMORPHONE HYDROCHLORIDE 2 MG/ML
0.5 INJECTION, SOLUTION INTRAMUSCULAR; INTRAVENOUS; SUBCUTANEOUS
Status: DISCONTINUED | OUTPATIENT
Start: 2021-10-14 | End: 2021-10-14 | Stop reason: HOSPADM

## 2021-10-14 RX ORDER — NALOXONE HYDROCHLORIDE 0.4 MG/ML
0.1 INJECTION, SOLUTION INTRAMUSCULAR; INTRAVENOUS; SUBCUTANEOUS AS NEEDED
Status: DISCONTINUED | OUTPATIENT
Start: 2021-10-14 | End: 2021-10-14 | Stop reason: HOSPADM

## 2021-10-14 RX ORDER — SODIUM CHLORIDE 0.9 % (FLUSH) 0.9 %
5-40 SYRINGE (ML) INJECTION AS NEEDED
Status: DISCONTINUED | OUTPATIENT
Start: 2021-10-14 | End: 2021-10-15 | Stop reason: HOSPADM

## 2021-10-14 RX ORDER — FLUMAZENIL 0.1 MG/ML
0.2 INJECTION INTRAVENOUS
Status: DISCONTINUED | OUTPATIENT
Start: 2021-10-14 | End: 2021-10-14 | Stop reason: HOSPADM

## 2021-10-14 RX ORDER — APREPITANT 40 MG/1
40 CAPSULE ORAL ONCE
Status: DISCONTINUED | OUTPATIENT
Start: 2021-10-14 | End: 2021-10-14 | Stop reason: HOSPADM

## 2021-10-14 RX ORDER — DIPHENHYDRAMINE HYDROCHLORIDE 50 MG/ML
12.5 INJECTION, SOLUTION INTRAMUSCULAR; INTRAVENOUS
Status: DISCONTINUED | OUTPATIENT
Start: 2021-10-14 | End: 2021-10-14 | Stop reason: HOSPADM

## 2021-10-14 RX ADMIN — POTASSIUM CHLORIDE, DEXTROSE MONOHYDRATE AND SODIUM CHLORIDE 75 ML/HR: 150; 5; 450 INJECTION, SOLUTION INTRAVENOUS at 19:22

## 2021-10-14 RX ADMIN — Medication 10 MG: at 14:13

## 2021-10-14 RX ADMIN — KETOROLAC TROMETHAMINE 30 MG: 30 INJECTION, SOLUTION INTRAMUSCULAR; INTRAVENOUS at 19:23

## 2021-10-14 RX ADMIN — DEXAMETHASONE SODIUM PHOSPHATE 4 MG: 4 INJECTION, SOLUTION INTRAMUSCULAR; INTRAVENOUS at 12:10

## 2021-10-14 RX ADMIN — HYDROMORPHONE HYDROCHLORIDE 0.5 MG: 2 INJECTION, SOLUTION INTRAMUSCULAR; INTRAVENOUS; SUBCUTANEOUS at 15:45

## 2021-10-14 RX ADMIN — CEFAZOLIN 2 G: 1 INJECTION, POWDER, FOR SOLUTION INTRAVENOUS at 11:44

## 2021-10-14 RX ADMIN — ROCURONIUM BROMIDE 50 MG: 10 INJECTION, SOLUTION INTRAVENOUS at 11:46

## 2021-10-14 RX ADMIN — KETAMINE HYDROCHLORIDE 30 MG: 50 INJECTION INTRAMUSCULAR; INTRAVENOUS at 11:44

## 2021-10-14 RX ADMIN — PROPOFOL 170 MG: 10 INJECTION, EMULSION INTRAVENOUS at 11:46

## 2021-10-14 RX ADMIN — FENTANYL CITRATE 100 MCG: 50 INJECTION INTRAMUSCULAR; INTRAVENOUS at 11:46

## 2021-10-14 RX ADMIN — Medication 10 MG: at 11:46

## 2021-10-14 RX ADMIN — PHENYLEPHRINE HYDROCHLORIDE 120 MCG: 10 INJECTION INTRAVENOUS at 11:48

## 2021-10-14 RX ADMIN — Medication 10 MG: at 12:22

## 2021-10-14 RX ADMIN — KETAMINE HYDROCHLORIDE 10 MG: 50 INJECTION INTRAMUSCULAR; INTRAVENOUS at 12:44

## 2021-10-14 RX ADMIN — GLYCOPYRROLATE 0.4 MG: 0.2 INJECTION, SOLUTION INTRAMUSCULAR; INTRAVENOUS at 14:22

## 2021-10-14 RX ADMIN — HYDROMORPHONE HYDROCHLORIDE 0.5 MG: 2 INJECTION, SOLUTION INTRAMUSCULAR; INTRAVENOUS; SUBCUTANEOUS at 16:03

## 2021-10-14 RX ADMIN — LIDOCAINE HYDROCHLORIDE 80 MG: 20 INJECTION, SOLUTION EPIDURAL; INFILTRATION; INTRACAUDAL; PERINEURAL at 11:46

## 2021-10-14 RX ADMIN — PHENYLEPHRINE HYDROCHLORIDE 120 MCG: 10 INJECTION INTRAVENOUS at 11:40

## 2021-10-14 RX ADMIN — SODIUM CHLORIDE, SODIUM LACTATE, POTASSIUM CHLORIDE, AND CALCIUM CHLORIDE 100 ML/HR: 600; 310; 30; 20 INJECTION, SOLUTION INTRAVENOUS at 08:09

## 2021-10-14 RX ADMIN — ONDANSETRON 4 MG: 2 INJECTION INTRAMUSCULAR; INTRAVENOUS at 14:04

## 2021-10-14 RX ADMIN — Medication 10 MG: at 11:44

## 2021-10-14 RX ADMIN — HYDROMORPHONE HYDROCHLORIDE 0.5 MG: 2 INJECTION, SOLUTION INTRAMUSCULAR; INTRAVENOUS; SUBCUTANEOUS at 15:33

## 2021-10-14 RX ADMIN — KETAMINE HYDROCHLORIDE 10 MG: 50 INJECTION INTRAMUSCULAR; INTRAVENOUS at 13:44

## 2021-10-14 RX ADMIN — HYDROMORPHONE HYDROCHLORIDE 1 MG: 2 INJECTION INTRAMUSCULAR; INTRAVENOUS; SUBCUTANEOUS at 12:16

## 2021-10-14 RX ADMIN — Medication 3 MG: at 14:22

## 2021-10-14 RX ADMIN — Medication 10 ML: at 21:39

## 2021-10-14 NOTE — PERIOP NOTES
TRANSFER - OUT REPORT:    Verbal report given to [address] on Gary Mcdaniels  being transferred to [address] [place] [address] for routine post - op       Report consisted of patients Situation, Background, Assessment and   Recommendations(SBAR). Information from the following report(s) OR Summary, Procedure Summary, Intake/Output, MAR and Cardiac Rhythm SR was reviewed with the receiving nurse. Lines:   Peripheral IV 10/14/21 Left;Posterior Forearm (Active)   Site Assessment Clean, dry, & intact 10/14/21 1612   Phlebitis Assessment 0 10/14/21 1612   Infiltration Assessment 0 10/14/21 1612   Dressing Status Clean, dry, & intact 10/14/21 1612   Dressing Type Tape;Transparent 10/14/21 1612   Hub Color/Line Status Flushed 10/14/21 1612       Subcutaneous Infusion Line 10/14/21 Right;Upper;Left Other (comment) (Active)   Site Assessment Clean, dry, & intact 10/14/21 1612   Dressing Status Clean, dry, & intact 10/14/21 1612   Dressing Type Tape;Transparent 10/14/21 1612   Hub Color/Line Status Infusing 10/14/21 1612        Opportunity for questions and clarification was provided. Patient transported with:   O2 @ 2lnc liters    VTE prophylaxis orders have been written for Gary Mcdaniels. Patient and family given floor number and nurses name. Family updated re: pt status after security code verified.

## 2021-10-14 NOTE — PERIOP NOTES
Pt is wearing hearing aid in right ear, remains for surgery due to pt deafness and need to read lips

## 2021-10-14 NOTE — PROGRESS NOTES
TRANSFER - IN REPORT:    Verbal report received from Margarito Alonzo RN(name) on Xuan Gates  being received from PACU (unit) for routine progression of care      Report consisted of patients Situation, Background, Assessment and   Recommendations(SBAR). Information from the following report(s) OR Summary was reviewed with the receiving nurse. Opportunity for questions and clarification was provided.

## 2021-10-14 NOTE — H&P
Primary/Requesting provider: Georges Aguirre NP     CC: Breast cancer     HISTORY OF PRESENT ILLNESS  Donna Sanz is a 63 y.o. female. HPI  Patient is a 63 y.o. female who presents for discussion of treatment for her recently diagnosed right breast cancer. She is with her , Hali Holter. She did not have any pre-diagnosis symptoms; routine mammography revealed suspicious calcifications prompting evaluation. She has had a previous remote left breast biopsy. Her mother has had breast cancer in both breasts, 5 years apart, and a sister was diagnosed at 58. She denies any breast pain, swelling, nipple discharge or retraction, and denies any significant bruising from her biopsy. She is, however, quite self-conscious about her significant breast size asymmetry, with her left being significantly larger volume than the right since her youth. This has bothered her throughout her adult life and she finds it \"embarrassing. \"     Medications:        Current Outpatient Medications   Medication Sig    cholecalciferol (VITAMIN D3) (1000 Units /25 mcg) tablet Take 2,000 Units by mouth daily.  diazePAM (VALIUM) 5 mg tablet Take 5 mg by mouth daily as needed.  biotin (VITAMIN B7) 5 mg capsule Take 1 Tablet by mouth daily.  dicyclomine (BENTYL) 20 mg tablet TAKE 1 TABLET BY MOUTH FOUR TIMES DAILY    erythromycin with ethanoL (ERYDERM) 2 % external solution APPLY EXTERNALLY TO THE AFFECTED AREA TWICE DAILY WITH SPONGE    flaxseed oiL 1,000 mg cap Take 1 Tablet by mouth three (3) times daily.  FLUoxetine (PROzac) 20 mg capsule Take 40 mg by mouth daily.  FLUoxetine (PROzac) 40 mg capsule Take 40 mg by mouth daily.  NP Thyroid 30 mg tablet Take 30 mg by mouth daily.  valACYclovir (VALTREX) 500 mg tablet Take 500 mg by mouth daily.  Multivit, Iron, Min #3, FA (MULTIVIT, IRON, MIN.  3-IRON-FA PO) Take 1,636 mg by mouth three (3) times daily.      No current facility-administered medications for this visit.         Allergies: No Known Allergies      Past History:       Past Medical History:   Diagnosis Date    Menopause              Past Surgical History:   Procedure Laterality Date    VIDA BIOPSY BREAST STEREOTACTIC Left 2014    VIDA BIOPSY BREAST STEREOTACTIC Right 07/30/2021         Family and Social History:        Family History   Problem Relation Age of Onset   Libby Vazquez Breast Cancer Mother 79    Breast Cancer Sister 58      Social History            Socioeconomic History    Marital status:        Spouse name: Not on file    Number of children: Not on file    Years of education: Not on file    Highest education level: Not on file   Occupational History    Not on file   Tobacco Use    Smoking status: Never Smoker    Smokeless tobacco: Never Used   Substance and Sexual Activity    Alcohol use: Not on file    Drug use: Not on file    Sexual activity: Not on file   Other Topics Concern    Not on file   Social History Narrative    Not on file      Social Determinants of Health          Financial Resource Strain:     Difficulty of Paying Living Expenses:    Food Insecurity:     Worried About Running Out of Food in the Last Year:     Ran Out of Food in the Last Year:    Transportation Needs:     Lack of Transportation (Medical):  Lack of Transportation (Non-Medical):    Physical Activity:     Days of Exercise per Week:     Minutes of Exercise per Session:    Stress:     Feeling of Stress :    Social Connections:     Frequency of Communication with Friends and Family:     Frequency of Social Gatherings with Friends and Family:     Attends Scientologist Services:     Active Member of Clubs or Organizations:     Attends Club or Organization Meetings:     Marital Status:    Intimate Partner Violence:     Fear of Current or Ex-Partner:     Emotionally Abused:     Physically Abused:     Sexually Abused:        Review of Systems   Constitutional: Negative for chills, fever and weight loss. HENT: Positive for hearing loss. Negative for congestion, sinus pain and sore throat. Eyes:        GLASSES   Respiratory: Negative for cough, shortness of breath and wheezing. Cardiovascular: Negative for chest pain, palpitations and leg swelling. Gastrointestinal: Negative for abdominal pain, blood in stool, constipation, diarrhea, heartburn, nausea and vomiting. Genitourinary: Negative for dysuria, frequency and urgency. Musculoskeletal: Negative for back pain, joint pain and neck pain. Skin: Negative for itching and rash. Right Breast DCIS   Neurological: Positive for speech change (consistent with hearing loss). Negative for dizziness, seizures, weakness and headaches. Endo/Heme/Allergies: Negative for environmental allergies and polydipsia. Bruises/bleeds easily. Psychiatric/Behavioral: Negative for depression. The patient is not nervous/anxious and does not have insomnia.          Physical Exam  Vitals and nursing note reviewed. Constitutional:       Appearance: She is well-developed. She is not toxic-appearing or diaphoretic. HENT:      Head: Normocephalic and atraumatic. Eyes:      General: No scleral icterus. Conjunctiva/sclera: Conjunctivae normal.      Pupils: Pupils are equal, round, and reactive to light. Neck:      Thyroid: No thyromegaly. Vascular: No JVD. Trachea: No tracheal deviation. Cardiovascular:      Rate and Rhythm: Normal rate and regular rhythm. Heart sounds: No murmur heard. No friction rub. No gallop. Pulmonary:      Effort: Pulmonary effort is normal. No respiratory distress. Breath sounds: Normal breath sounds. No wheezing or rales. Chest:       Abdominal:      Palpations: Abdomen is soft. Musculoskeletal:      Cervical back: Normal range of motion. Comments: No gross deformities   Skin:     General: Skin is warm. Neurological:      Mental Status: She is alert. Cranial Nerves: No cranial nerve deficit. Psychiatric:         Behavior: Behavior is cooperative.         US Results (most recent):  No results found for this or any previous visit. [facility] Results (most recent):  Results from [place] encounter on 07/30/21     [facility] BREAST RT SURG SPEC     Addendum 8/11/2021  7:13 PM  Addendum: Jayce March, accession number: [id number]  Date: 8/11/2021 Pathology was noted as A: Right breast calcifications at 12  o'clock: Benign breast tissue. Definitive microcalcifications are not  identified. B: Right breast calcifications at 12 o'clock: Invasive ductal  carcinoma, Eleuterio grade I/III with associated microcalcifications. Low grade  ductal carcinoma in situ, cribriform type. See comment.     Results are concordant with imaging. Results and recommendations were given to  the patient in person on 8/10/2021 by me and the Navigator.     Pathology report was called to [facility] office on 8/3/2021 by RT Vic(ERIBERTO)(JESUS). Patient was referred to Dr. Augusta Ruvalcaba and has an appointment  scheduled with him on 8/31/2021.     Narrative  Stereotactic Breast Biopsy  Digital diagnostic mammogram  Specimen radiograph     INDICATION: Indeterminate right breast 12:00 small cluster of calcifications. No  previous personal malignancy. Contralateral benign biopsy in 2014. Her mother  and sister had breast cancer.     COMPARISON: 6/11/2021 and 7/16/2021     PROCEDURE: After informed consent was obtained, the right 12:00 calcifications  were localized from a lateral approach. The overlying skin was prepped and  draped in sterile fashion. Total of 10 mL of 1% Lidocaine was used for  superficial anesthesia.     Using stereotactic guidance, a 9 gauge needle was advanced into the breast.  After appropriate position of the needle was confirmed with stereotactic  imaging, 6 core samples were obtained with vacuum assistance. No definite  calcifications were seen in the initial samples.  Then an additional 12 core  samples were obtained with vacuum assistance. A clip was left to tommy the biopsy  site. The needle was withdrawn and hemostasis was obtained. Patient tolerated  the procedure well without complications.     Specimen Radiograph:  Multiple calcifications are present in the final  stereotactic specimens.     Postprocedure Mammogram:  Postprocedure right mammogram shows the biopsy clip  displaced about 3 cm posterior and laterally from the biopsy site. Gas and a few  faint residual calcifications are seen at the biopsy site.     Impression  1. Successful vacuum-assisted stereotactic core biopsy of  calcifications in the right 12:00 breast.     2. Clip migration noted.     Pathology results are currently pending.        MRI Results (most recent):  Results from Hospital Encounter encounter on 08/26/21     MRI BREAST BI W WO CONT     Narrative  MRI of the Breasts     INDICATION: New diagnosis of right breast cancer     Standard MRI sequences were obtained through the breasts in multiple planes. Images were obtained before and after intravenous infusion of 15 ml of ProHance.        COMPARISON: Mammograms dated 06/11/2021     FINDINGS:  -BACKGROUND: No significant background enhancement.     -RIGHT BREAST: There are biopsy changes in the lateral right breast enhancement  extending medially and anteriorly from the biopsy site. Total extent of  enhancement is approximately 2.2 cm. No other enhancing masses are seen in the  right breast.     -LEFT BREAST: No areas of abnormal enhancement. No significant cystic change.     -AXILLA: No significant adenopathy.     -CHEST WALL: No chest wall lesions.     -OTHER: No other significant findings.     Impression  2.2 cm enhancing lesion in the superior right breast consistent with  primary breast tumor. No other evidence of malignancy.     BI-RADS Assessment Category 6: Known Biopsy Proven Malignancy. (#[id number])       ASSESSMENT and PLAN       Encounter Diagnoses   Name Primary?     Ductal carcinoma of breast, right ([facility] Yes    Developmental breast asymmetry      Hearing loss, unspecified hearing loss type, unspecified laterality      Family history of breast cancer        Discussed pathology in detail with pt and . ER status is PENDING; discussed how this may modify her post-surgical management including treatment with anti-estrogen agents and the need to stop any hormonal supplementation she may be taking. Discussed family history as it may relate to any value to investigation of a genetic basis for her cancer. Genetics not indicated     Discussed management options. Initial treatment should be surgical. Reviewed total vs. Partial mastectomy (w/ XRT) including different local recurrence rates but equivalent long term survival rates. Discussed potential indications for re-excision and completion ALND as well.       While there is a slight communication concern due to pt's hearing loss (she reads lips), it is clear that she came today believing she was interested in breast conservation. However, given her profound asymmetry, mastectomy with reconstruction may be a better holistic intervention for her. After significant discussion, she is at least interested in pursuing this further and would like to meet with plastics.     We discussed indication for empiric sentinel node biopsy if mastectomy is chosen for DCIS due to the approx 20% incidence of upstaging to invasive disease.     She will contact office after meeting with plastics- if she wishes to schedule mastectomy, this can be arranged. If she chooses to just have partial mastectomy, she will require wire localization and this, too, can be scheduled. total time >60min.     Orders Placed This Encounter    PLASTIC SURGERY       Referral Priority:   Routine       Referral Type:   Consultation       Referral Reason:   Specialty Services Required       Referred to Provider:   Vandana Hoff MD       Number of Visits Requested:   1        ADDENDUM 9/30- path is IDC w/DCIS, not pure DCIS thus SLN is indicated and is to be scheduled. Pt has chosen bilateral mastectomy.   ER/PA not completed due to absence of residual IDC in specimen (per report)

## 2021-10-14 NOTE — OP NOTES
Operative Note    Date of Surgery: 10/14/2021    Preoperative Diagnosis: [facility] carcinoma [facility] [C50.911]  Family history of breast cancer [Z80.3]  Developmental breast asymmetry [N64.89]     Postoperative Diagnosis: Ductal carcinoma of right breast ([facility] [address] [C50.911]  Family history of breast cancer [Z80.3]  Developmental breast asymmetry [N64.89]     Surgeon(s) and Role:     * Venkatesh Camara MD - Primary      Anesthesia: General    Procedure:   Procedure(s):  BILATERAL BREAST MASTECTOMY  RIGHT SENTINEL LYMPH NODE BIOPSY   BILATERAL BREAST TISSUE EXPANDER INSERTION    Indications:  As detailed in the H&P. Procedure in Detail:  The patient was taken to the operating room, identified as Davidson Maria, and the procedure verified. A Time Out was held and the above information confirmed. Prior to the operative procedure,  Davidson Maria underwent a Right breast technetium nuclear medicine scan. The technetium lymphoscintigraphy showed uptake in 1 axillary lymph nodes. The patient was then brought to the operating room and placed on the table in a supine position with adequate padding to all pressure points and the arms extended laterally. After induction of anesthesia  the chest, axillae, and upper arms were then prepped and draped in the usual sterile manner. The left, non-cancerous side was approached first.  The incision as marked preoperatively by plastic surgery was made and cautery was used to elevate the skin flaps, attempting to maintain the avascular superficial subcutaneous plane. Margins of dissection were the clavicle, costosternal junction and rectus fascia. The breast was then amputated off of the chest wall, leaving the pectoralis fascia, and laterally it was transected off of the skin and lateral margin of the pectoralis major and it was removed. The wound was irrigated and hemostasis was achieved with cautery.     Identical technique was used to complete the right mastectomy. On the right side, the pectoralis fascia was excised. The axilla was entered sharply and careful sharp and blunt dissection was then used following visual and gamma probe cues to identify 2 axillary  nodes which were dissected from the surrounding tissues using clips to control lymphatic and vascular structures to the node, and each node was sent separately to pathology; nodes had max ex-vivo count of 6064 and 905cps. The probe was then used to sweep the axilla and no further activity >10% of lowest node count was noted. The surgical site was irrigated and confirmed to be hemostatic. The case was then turned over to plastic surgery for stage 1 reconstruction.     ID Type Source Tests Collected by Time Destination   1 : LEFT BREAST Fresh Breast  Johnnie Serrato MD 10/14/2021 1209 Pathology   2 : RIGHT BREAST Fresh Breast  Johnnie Serrato MD 10/14/2021 1229 Pathology   3 : #1 sentinel node  [place] Breast  Johnnie Serrato MD 10/14/2021 1249 Pathology   4 : #2 sentinel node  [place] Breast  Johnnie Serrato MD 10/14/2021 1251 Pathology       10/14/2021  Lucila Ireland MD

## 2021-10-14 NOTE — ANESTHESIA PREPROCEDURE EVALUATION
Relevant Problems   No relevant active problems       Anesthetic History   No history of anesthetic complications            Review of Systems / Medical History  Patient summary reviewed, nursing notes reviewed and pertinent labs reviewed    Pulmonary  Within defined limits                 Neuro/Psych         Psychiatric history     Cardiovascular  Within defined limits                Exercise tolerance: >4 METS     GI/Hepatic/Renal  Within defined limits              Endo/Other      Hypothyroidism: well controlled  Cancer (breast)     Other Findings   Comments: Hard of hearing           Physical Exam    Airway  Mallampati: II  TM Distance: 4 - 6 cm  Neck ROM: normal range of motion   Mouth opening: Normal     Cardiovascular  Regular rate and rhythm,  S1 and S2 normal,  no murmur, click, rub, or gallop             Dental  No notable dental hx       Pulmonary  Breath sounds clear to auscultation               Abdominal         Other Findings            Anesthetic Plan    ASA: 2  Anesthesia type: general          Induction: Intravenous  Anesthetic plan and risks discussed with: Patient

## 2021-10-14 NOTE — BRIEF OP NOTE
Brief Postoperative Note    Patient: Markus García  YOB: 1960  MRN: [id number]    Date of Procedure: 10/14/2021     Pre-Op Diagnosis: Ductal carcinoma of right breast ([address] Lanny Dang  Family history of breast cancer [Z80.3]  Developmental breast asymmetry [N64.89]    Post-Op Diagnosis: Same    Procedure(s):  BILATERAL BREAST MASTECTOMY  RIGHT SENTINEL LYMPH NODE BIOPSY   BILATERAL BREAST TISSUE EXPANDER INSERTION    Surgeon(s):  MD Petrona Mcnair MD    Surgical Assistant: None    Anesthesia: General     Estimated Blood Loss (mL): 25IQ    Complications: none    Specimens:   ID Type Source Tests Collected by Time Destination   1 : LEFT BREAST Fresh Breast  Tiffanie Abad MD 10/14/2021 1209 Pathology   2 : RIGHT BREAST Fresh Breast  Tiffanie Abad MD 10/14/2021 1229 Pathology   3 : #1 sentinel node  Fresh Breast  Tiffanie Abad MD 10/14/2021 1249 Pathology   4 : #2 sentinel node  Fresh Breast  Tiffanie Abad MD 10/14/2021 1251 Pathology        Implants:   Implant Name Type Inv.  Item Serial No.  Lot No. LRB No. Used Action   EXPANDER BRST 455CC [id number] P7.6CM ARA TEXT ULT HI PROF - [id number]  EXPANDER BRST 455CC [id number] P7.6CM ARA TEXT ULT HI PROF [id number] [employer]  Left 1 Implanted   EXPANDER BRST 455CC [id number] P7.6CM ARA TEXT ULT HI PROF - [id number]  EXPANDER BRST 455CC [id number] P7.6CM ARA TEXT ULT HI PROF [id number] [employer]  Right 1 Implanted       Drains:   Danilo-Perez Drain 10/14/21 Right;Upper Abdomen (Active)       Danilo-Perez Drain 10/14/21 Right;Upper Breast (Active)       Findings: none    Electronically Signed by Lani Heaton MD on 10/14/2021 at 2:27 PM

## 2021-10-15 VITALS
DIASTOLIC BLOOD PRESSURE: 83 MMHG | BODY MASS INDEX: 22.21 KG/M2 | SYSTOLIC BLOOD PRESSURE: 96 MMHG | OXYGEN SATURATION: 92 % | TEMPERATURE: 98.6 F | HEIGHT: 66 IN | RESPIRATION RATE: 16 BRPM | HEART RATE: 70 BPM | WEIGHT: 138.2 LBS

## 2021-10-15 LAB
ANION GAP SERPL CALC-SCNC: 4 MMOL/L (ref 7–16)
BASOPHILS # BLD: 0 K/UL (ref 0–0.2)
BASOPHILS NFR BLD: 0 % (ref 0–2)
BUN SERPL-MCNC: 13 MG/DL (ref 6–23)
CALCIUM SERPL-MCNC: 9 MG/DL (ref 8.3–10.4)
CHLORIDE SERPL-SCNC: 106 MMOL/L (ref 98–107)
CO2 SERPL-SCNC: 30 MMOL/L (ref 21–32)
CREAT SERPL-MCNC: 0.68 MG/DL (ref 0.6–1)
DIFFERENTIAL METHOD BLD: ABNORMAL
EOSINOPHIL # BLD: 0 K/UL (ref 0–0.8)
EOSINOPHIL NFR BLD: 0 % (ref 0.5–7.8)
ERYTHROCYTE [DISTWIDTH] IN BLOOD BY AUTOMATED COUNT: 13.2 % (ref 11.9–14.6)
GLUCOSE SERPL-MCNC: 122 MG/DL (ref 65–100)
HCT VFR BLD AUTO: 36.4 % (ref 35.8–46.3)
HGB BLD-MCNC: 11.9 G/DL (ref 11.7–15.4)
IMM GRANULOCYTES # BLD AUTO: 0 K/UL (ref 0–0.5)
IMM GRANULOCYTES NFR BLD AUTO: 0 % (ref 0–5)
LYMPHOCYTES # BLD: 1.6 K/UL (ref 0.5–4.6)
LYMPHOCYTES NFR BLD: 16 % (ref 13–44)
MCH RBC QN AUTO: 30.7 PG (ref 26.1–32.9)
MCHC RBC AUTO-ENTMCNC: 32.7 G/DL (ref 31.4–35)
MCV RBC AUTO: 93.8 FL (ref 79.6–97.8)
MONOCYTES # BLD: 0.8 K/UL (ref 0.1–1.3)
MONOCYTES NFR BLD: 8 % (ref 4–12)
NEUTS SEG # BLD: 7.6 K/UL (ref 1.7–8.2)
NEUTS SEG NFR BLD: 75 % (ref 43–78)
NRBC # BLD: 0 K/UL (ref 0–0.2)
PLATELET # BLD AUTO: 206 K/UL (ref 150–450)
PMV BLD AUTO: 9.7 FL (ref 9.4–12.3)
POTASSIUM SERPL-SCNC: 4.1 MMOL/L (ref 3.5–5.1)
RBC # BLD AUTO: 3.88 M/UL (ref 4.05–5.2)
SODIUM SERPL-SCNC: 140 MMOL/L (ref 136–145)
WBC # BLD AUTO: 10 K/UL (ref 4.3–11.1)

## 2021-10-15 PROCEDURE — 85025 COMPLETE CBC W/AUTO DIFF WBC: CPT

## 2021-10-15 PROCEDURE — 74011250636 HC RX REV CODE- 250/636: Performed by: PLASTIC SURGERY

## 2021-10-15 PROCEDURE — 99218 HC RM OBSERVATION: CPT

## 2021-10-15 PROCEDURE — 74011250637 HC RX REV CODE- 250/637: Performed by: PLASTIC SURGERY

## 2021-10-15 PROCEDURE — 80048 BASIC METABOLIC PNL TOTAL CA: CPT

## 2021-10-15 PROCEDURE — 36415 COLL VENOUS BLD VENIPUNCTURE: CPT

## 2021-10-15 RX ADMIN — Medication 10 ML: at 06:00

## 2021-10-15 RX ADMIN — HYDROCODONE BITARTRATE AND ACETAMINOPHEN 2 TABLET: 7.5; 325 TABLET ORAL at 04:15

## 2021-10-15 RX ADMIN — ENOXAPARIN SODIUM 40 MG: 40 INJECTION SUBCUTANEOUS at 08:44

## 2021-10-15 RX ADMIN — LEVOTHYROXINE, LIOTHYRONINE 30 MG: 38; 9 TABLET ORAL at 08:41

## 2021-10-15 RX ADMIN — FLUOXETINE HYDROCHLORIDE 40 MG: 20 CAPSULE ORAL at 08:42

## 2021-10-15 RX ADMIN — POTASSIUM CHLORIDE, DEXTROSE MONOHYDRATE AND SODIUM CHLORIDE 75 ML/HR: 150; 5; 450 INJECTION, SOLUTION INTRAVENOUS at 10:08

## 2021-10-15 NOTE — PROGRESS NOTES
CM met with patient in room 522. Patient on RA. Patient requested CM to lower mask to read lips. Patient alert and oriented x 4. Lives with spouse. Independent with ADL's. Patient or spouse drives to appointments. Patient uses no DME at home at this time. Will follow for CM needs. Care Management Interventions  PCP Verified by CM:  Yes (Chris Martinez NP)  Transition of Care Consult (CM Consult): Discharge Planning  Discharge Durable Medical Equipment: No  Support Systems: Spouse/Significant Other  Confirm Follow Up Transport: Self  The Plan for Transition of Care is Related to the Following Treatment Goals : return to baseline  [address] Provided?: No  Discharge Location  Discharge Placement: Home with family assistance

## 2021-10-15 NOTE — PROGRESS NOTES
S/p bilat mastectomy, R SLN, reconstruction POD 1  No pain overnight, some this morning around 0700 seemingly from trying to push herself up in bed. No nausea    Visit Vitals  BP 98/69 (BP 1 Location: Right upper arm, BP Patient Position: Supine)   Pulse 72   Temp 98.4 °F (36.9 °C)   Resp 16   Ht 5' 6\" (1.676 m)   Wt 138 lb 3.2 oz (62.7 kg)   SpO2 99%   BMI 22.31 kg/m²        In NAD  JPs blood-tinged bilaterally.  Dressings dry  Visible chest wall noncellulitic    Plan-  Discharge home  Has Rx's from Dr Carlos Salgado as previously scheduled

## 2021-10-15 NOTE — ANESTHESIA POSTPROCEDURE EVALUATION
Procedure(s):  BILATERAL BREAST MASTECTOMY  RIGHT SENTINEL LYMPH NODE BIOPSY   BILATERAL BREAST TISSUE EXPANDER INSERTION.     general    Anesthesia Post Evaluation      Multimodal analgesia: multimodal analgesia used between 6 hours prior to anesthesia start to PACU discharge  Patient location during evaluation: bedside  Patient participation: complete - patient participated  Level of consciousness: awake and alert  Pain management: adequate  Airway patency: patent  Anesthetic complications: no  Cardiovascular status: acceptable  Respiratory status: acceptable  Hydration status: acceptable  Post anesthesia nausea and vomiting:  controlled  Final Post Anesthesia Temperature Assessment:  Normothermia (36.0-37.5 degrees C)      INITIAL Post-op Vital signs:   Vitals Value Taken Time   /56 10/14/21 1630   Temp 36.4 °C (97.5 °F) 10/14/21 1612   Pulse 64 10/14/21 1630   Resp 16 10/14/21 1630   SpO2 98 % 10/14/21 1630

## 2021-10-15 NOTE — OP NOTES
[address] [address]  OPERATIVE REPORT    Name:  Amna Unger  MR#:  [id number]  :  1960  ACCOUNT #:  [de-identified]  DATE OF SERVICE:  10/14/2021    PREOPERATIVE DIAGNOSIS:  Right breast cancer. POSTOPERATIVE DIAGNOSIS:  Right breast cancer. PROCEDURE PERFORMED:  Bilateral breast reconstruction with tissue expanders. SURGEON:  Marietta Hightower MD    ASSISTANT:  Kiran CALI    ANESTHESIA:  General.    COMPLICATIONS:  None. SPECIMENS REMOVED:  None. IMPLANTS:  As below    ESTIMATED BLOOD LOSS:  25 mL. INDICATIONS:  The patient presents with right breast cancer, planning bilateral mastectomy. She also has a history of a tubular breast on the right side. PROCEDURE:  After informed consent was obtained from the patient, she was marked in my office the day before surgery. She was then taken to operating room, placed in the supine position and prepped and draped in the usual fashion. On the left side, her mastectomy was performed through a modified reduction incision with a small transverse component. On the right side, however, I did a vertical incision initially minimizing the lower pole skin that was taken and focusing mainly on the enlarged areola for the skin excision component. Once the mastectomies were completed, then I began the reconstruction starting on the left side. I elevated the pectoralis muscle from lateral to medial, portion of lateral serratus was also elevated and then a subpectoral pocket was developed with the use of lighted retractor and Bovie cautery. Once an adequate space had been dissected, the whole pocket was irrigated with antibiotic irrigation and reinspected for hemostasis. A 10 flat CELENA drain was placed and secured with a 4-0 nylon stitch as well as a pain pump catheter. Once that was completed, then the pocket was re-measured and a 455 mL moderate height Artoura textured tissue expander from [employer] was selected.   This was soaked in antibiotic irrigation and all air was removed. It was placed into the subpectoral pocket. I began closing the muscular space with 2-0 Vicryl figure-of-eight stitch, Ethilon suture was placed and noticed a hole in the tissue expander itself. This was then removed and inspected. No direct injury was seen to the expander in the process of placement or cutting the suture and it was definitely not a suture hole in the tissue expander, the hole was superior to where the stitch had been placed. This was then removed and taken off the field and new tissue expander box was opened. It was prepared in the same fashion, all air was removed and it was soaked in antibiotic irrigation and placed into the subpectoral pocket. Then figure-of-eight 2-0 Vicryl sutures were used to close the muscular space and then the expander itself was filled through a closed saline filling system tinged with methylene blue to a total of 120 mL. The skin was closed in two layers in an inverted T fashion with deep 3-0 Vicryl followed by a running 4-0 Monocryl subcuticular stitch. On the right side, the same size expander was used. The dissection was done in the same fashion and once the expander was placed, the muscular space was closed. I filled the expander on this side with only 60 mL. It was slightly tighter in the lower pole on this side, hence I decided to use left saline for the patient on that side. In addition, I did take a small transverse component for shaping and this was ellipsed out with a 15 blade. She was then closed in two layers with 3-0 Vicryl and 4-0 Monocryl. Skin was dressed with nitroglycerin paste for venous outflow as well as Xeroform, bacitracin, gauze, ABD pads and a surgical bra. She tolerated the procedure very well and she was escorted to the recovery room in stable condition.       MD MERVAT [id number]  D:  10/14/2021 16:15  T:  10/15/2021 4:08  JOB #:  [id number]

## 2021-10-15 NOTE — PROGRESS NOTES
10/15/21 0850   Incentive Spirometry Treatment   Level of Service Initial   Predicted Volume (ml) 1500 ml   Actual Volume (ml) 2000 ml   % Predicted Volume (ml) 1.33   Treatment Tolerance Patient tolerated

## 2021-10-15 NOTE — DISCHARGE INSTRUCTIONS
Strip and empty each drain at least twice a day and record the amount of output. Take this list with you to your follow up appointment.

## 2021-10-15 NOTE — PROGRESS NOTES
Received return call from patient's  requesting update on patient's condition. He is aware patient is resting comfortably and has ambulated to the restroom. He inquired as to her discharge plan for today. At this point, no physician has rounded on her and we do not a written order at this time. Will call and update  when the doctor rounds.    [phone]

## 2021-10-15 NOTE — PROGRESS NOTES
Pt is for discharge home today with no needs/supportive care orders received for CM at this time. Pt will have close follow up with PCP    Milestones met    Care Management Interventions  PCP Verified by CM:  Yes (Tan Conroy NP)  Mode of Transport at Discharge: Self  Transition of Care Consult (CM Consult): Discharge Planning  Discharge Durable Medical Equipment: No  Physical Therapy Consult: No  Occupational Therapy Consult: No  Speech Therapy Consult: No  Support Systems: Spouse/Significant Other  Confirm Follow Up Transport: Self  The Plan for Transition of Care is Related to the Following Treatment Goals : return to baseline  The Patient and/or Patient Representative was Provided with a Choice of Provider and Agrees with the Discharge Plan?: Yes  Name of the Patient Representative Who was Provided with a Choice of Provider and Agrees with the Discharge Plan: patient  Freedom of Choice List was Provided with Basic Dialogue that Supports the Patient's Individualized Plan of Care/Goals, Treatment Preferences and Shares the Quality Data Associated with the Providers?: Yes  [place] Resource Information Provided?: No  Discharge Location  Discharge Placement: Home with family assistance

## 2021-11-23 PROBLEM — D05.11 DUCTAL CARCINOMA IN SITU (DCIS) OF RIGHT BREAST: Status: ACTIVE | Noted: 2021-11-23

## 2022-02-24 RX ORDER — CEFAZOLIN SODIUM/WATER 2 G/20 ML
2 SYRINGE (ML) INTRAVENOUS ONCE
Status: CANCELLED | OUTPATIENT
Start: 2022-02-24 | End: 2022-02-24

## 2022-03-02 ENCOUNTER — HOSPITAL ENCOUNTER (OUTPATIENT)
Dept: SURGERY | Age: 62
Discharge: HOME | End: 2022-03-02

## 2022-03-03 VITALS — BODY MASS INDEX: 21.69 KG/M2 | WEIGHT: 135 LBS | HEIGHT: 66 IN

## 2022-03-03 NOTE — PERIOP NOTES
Patient verified name and . Order for consent found in EHR and matches case posting; patient verifies procedure. Bilateral removal tissue expanders place gel implants    Type 1B surgery, PAT phone assessment complete. Orders received. Labs per surgeon: none   Labs per anesthesia protocol: none    Patient answered medical/surgical history questions at their best of ability. All prior to admission medications documented in [facility]. Patient instructed to take the following medications the day of surgery according to anesthesia guidelines with a small sip of water: Thyroid NP. On the day before surgery please take Acetaminophen 1000mg in the morning and then again before bed. You may substitute for Tylenol 650 mg. Hold all vitamins 7 days prior to surgery and NSAIDS 5 days prior to surgery. Patient instructed on the following:    > Arrive at A Entrance, time of arrival to be called the day before by 1700  > NPO after midnight including gum, mints, and ice chips  > Responsible adult must drive patient to the hospital, stay during surgery, and patient will need supervision 24 hours after anesthesia  > Use antibacterial soap in shower the night before surgery and on the morning of surgery  > All piercings must be removed prior to arrival.    > Leave all valuables (money and jewelry) at home but bring insurance card and ID on DOS.   > You may be required to pay a deductible or co-pay on the day of your procedure. You can pre-pay by calling [phone] if your surgery is at the [facility] or [phone] if your surgery is at the [facility]. > Do not wear make-up, nail polish, lotions, cologne, perfumes, powders, or oil on skin. Artificial nails are not permitted.

## 2022-03-03 NOTE — H&P
2022  Shea Mcardle, 63, F   [facility] Plastic Surgery   Pre-Operative History and Physical  Patient Information  :  Patient Information-  Shea Mcardle   : 1960   Scheduled Services for : [facility] Purpose: Breast Reconstruction - Exchange Tissue Expander For Permanent Implant - Bilateral Appt Notes: Surgery 3/9/22   Hospital PreOp by Phone / Nanci Test 3/7 @ 8:15am   Collect $1000   Referral Source: Doctor   Occupation:    Insurance: [address] [place] [address],[address]: [address] [facility]   Surgery date: 3/9/2022 Breast Implant Exchange - Bilateral Dr Luiza WEI   Medications / Allergies  :  List any allergies:  List any current medications (Please include over-the-counter medications)      Drug Dosage Prescribed by   1. Thyroid 30mg [facility]   2. Fluoxetine 40mg Dr. Kvng Serna   3. Diazepam 5mg Dr. Kvng Serna   Non-steroidal anti-inflammatory drugs (ex. Motrin, Aleve)  Do you take vitamins/minerals? Yes If yes, what kind? D3, RenewGlow hair vitamins   Current Medications  Name Sig Start Date Discontinue Date   cefadroxil 500 mg capsule 1 capsule by mouth BID 10/8/2021    cyclobenzaprine 10 mg tablet 1 tablet by mouth Q6-8h 10/8/2021    hydrocodone 7.5 mg-acetaminophen 325 mg tablet 1-2 tablet by mouth Q4-6h 1-2 tabs by mouth q 4-6 hrs PRNpain 10/8/2021    ondansetron 8 mg disintegrating tablet 1 tablet by mouth as directed Dissolve one tablet under tongue every 4-6 hours as needed for post-operative nausea. 10/8/2021    scopolamine 1.5 mg transdermal patch (1 mg over 3 days) 1 patch on the skin as directed Apply behind ear or under upper arm night before surgery. 10/8/2021    Height / Weight / BMI:  Height/Weight/BMI  Her height is 5ft 6in and weight is 135lbs. BMI is 21.79. Social / Family History:  ~MUS2 - Smoking Status  Former smoker.    Social History  Family History  Breast Cancer Mother Sister  Vital Signs:  ~MUS2 - Blood Pressure  117/71   Vitals  Pulse 66, Respiration 18, Temperature 98.1 and RAPS02 98%   Pre-Operative Diagnosis:  Diagnosis - Text  Diagnosis: Right breast cancer   Planned Procedure:  Planned Procedure  Bilateral removal of tissue expanders with placement of permanent implants   History of Present Illness  Past Medical / Surgical History:  Past Medical History  Thyroid Disorder <Details>  Review of Systems (check all current symptoms)  arthritis/joint disformity - no asthma - no chest pain - no heart attack - no high blood pressure - no inflamation of veins - no murmur - no pacemaker - no phlebitis - no convulsions - no epilepsy or seizures - no fainting - no fever or chills - no heart disease - no nausea, vomiting, diarrheah when taking antibiotics - no unexpected weight loss or weight gain - no chronic cough - no hearing loss *YES* 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 morning cough - no sinus disorder - no diabetes - no excessive thirst/hunger - no frequent urination - no thyroid disorder *YES* Hypothyroidism abdominal pain - no chronic diarrhea or constipation - no gastro-intestinal problems - no irregular heartbeat - no stomach absorptive disorder - no ulcers - no bladder problems - no currently breast feeding - no currently pregnant - no frequency/burning during urination - no kidney problems - no yeast infection - no anemia - no bleed easily - no blood clots - no blood transfusion - no arthralgia - no artificial joints - no limited motion in joints - no muscle weakness - no paralysis - no stroke - no numbness or tingling - no headache - no migraines - no bronchitis - no emphysema - no shortness of breath - no tuberculosis - no wheezing - no psychiatric treatment *YES* Depression/Anxiety recent crisis - no history of psychiatric hospitilization - no   Past Surgeries  Anesthesia History  - No past anesthesia problems. Ethnic Origin         Ethnic Origin III. Pale-skinned (Caucasians with dark hair and dark eyes)   Diagnosis Codes   :  Diagnosis Codes  Pre-Operative Physical Exam:  HEENT  HEENT: Deferred. Cardiac Exam II  Cardiac Exam: RRR. Pulmonary Exam  Pulmonary Exam: CTA. Abdominal Exam  Abdominal Exam: Deferred. Extremity & Neuro Exam  Extremity & Neuro Exam: Deferred. Pertinent System/Surgical Site Exam  Pre-Operative Checklist / Risks & Benefits:  Pre-op Checklist  1. Patient seen preoperatively. 2. All patient questions answered. 3. Risks and benefits (including alternative treatments) reviewed. 4. Proper prescriptions given. 5. Surgery time and date reviewed. 6. Complications both expected and unexpected discussed  7. Patient verbalized understanding of the outcome possibilites inherent with this procedure  8.  Photographs taken  Pre-Op Risks and Benefits  bleeding, infection, less than aesthetic result, scar, swelling, bruising, need for additional surgery, hypertrophic scar, wound breakdown, asymmetry, recurrence, numbness, contour irregularities, hematoma, seroma, under or over resection, DVT, partial skin flap loss, malposition of implant and implant failure   Surgical Risks Discussed  Pre-Operative Orders:  Pre-Operative Antibiotic Order Selection  Cefazolin 2GM IV piggyback (>80kg)   Pre-Operative Orders  Nothing by mouth after midnight, SCD's per Anesthesia Guidelines, Pre-Operative Antibiotic Selection and Labs per Anesthesia   Signature        Chapperone:  Chapperone 2  Signature - H&P - Pre-Op Orders:  Signature (Physician & Date) Pre-Op        Providers  Nicko Huang  Diagnosis Codes  C50.911 - Malignant neoplasm of unspecified site of right female breast

## 2022-03-08 ENCOUNTER — ANESTHESIA EVENT (OUTPATIENT)
Dept: SURGERY | Age: 62
End: 2022-03-08
Payer: COMMERCIAL

## 2022-03-08 RX ORDER — TRISODIUM CITRATE DIHYDRATE AND CITRIC ACID MONOHYDRATE 500; 334 MG/5ML; MG/5ML
30 SOLUTION ORAL
Status: CANCELLED | OUTPATIENT
Start: 2022-03-08 | End: 2022-03-09

## 2022-03-08 RX ORDER — SODIUM CHLORIDE 0.9 % (FLUSH) 0.9 %
5-40 SYRINGE (ML) INJECTION EVERY 8 HOURS
Status: CANCELLED | OUTPATIENT
Start: 2022-03-08

## 2022-03-08 RX ORDER — SODIUM CHLORIDE 0.9 % (FLUSH) 0.9 %
5-40 SYRINGE (ML) INJECTION AS NEEDED
Status: CANCELLED | OUTPATIENT
Start: 2022-03-08

## 2022-03-08 NOTE — PERIOP NOTES
[address] [facility] [facility] Phone Call (performed day before scheduled surgery):    1. Have you have any exposure to anyone who has tested positive for COVID19 in the last 7 days? Patient Response: none    2.    Have you experienced any of the below symptoms in the last 48 hours?      -New onset respiratory symptoms                  -Fever or chills                  -Cough / Congestion / running nose                  -Shortness of breath or difficulty breathing                  -Headache                 -Sore Throat                 -New loss or taste or smell                 -Nausea / Vomiting / Diarrhea           Patient Response:none

## 2022-03-09 ENCOUNTER — HOSPITAL ENCOUNTER (OUTPATIENT)
Age: 62
Setting detail: OUTPATIENT SURGERY
Discharge: HOME | End: 2022-03-09
Attending: PLASTIC SURGERY | Admitting: PLASTIC SURGERY
Payer: COMMERCIAL

## 2022-03-09 ENCOUNTER — ANESTHESIA (OUTPATIENT)
Dept: SURGERY | Age: 62
End: 2022-03-09
Payer: COMMERCIAL

## 2022-03-09 VITALS
DIASTOLIC BLOOD PRESSURE: 65 MMHG | BODY MASS INDEX: 22.42 KG/M2 | WEIGHT: 139.5 LBS | HEART RATE: 62 BPM | SYSTOLIC BLOOD PRESSURE: 137 MMHG | TEMPERATURE: 98.4 F | HEIGHT: 66 IN | RESPIRATION RATE: 18 BRPM | OXYGEN SATURATION: 100 %

## 2022-03-09 PROCEDURE — 77030040922 HC BLNKT HYPOTHRM STRY -A: Performed by: ANESTHESIOLOGY

## 2022-03-09 PROCEDURE — 74011250636 HC RX REV CODE- 250/636: Performed by: ANESTHESIOLOGY

## 2022-03-09 PROCEDURE — 77030040361 HC SLV COMPR DVT MDII -B: Performed by: PLASTIC SURGERY

## 2022-03-09 PROCEDURE — 74011250636 HC RX REV CODE- 250/636: Performed by: NURSE ANESTHETIST, CERTIFIED REGISTERED

## 2022-03-09 PROCEDURE — 76060000034 HC ANESTHESIA 1.5 TO 2 HR: Performed by: PLASTIC SURGERY

## 2022-03-09 PROCEDURE — C1789 PROSTHESIS, BREAST, IMP: HCPCS | Performed by: PLASTIC SURGERY

## 2022-03-09 PROCEDURE — 77030011266 HC ELECTRD BLD INSL COVD -A: Performed by: PLASTIC SURGERY

## 2022-03-09 PROCEDURE — 2709999900 HC NON-CHARGEABLE SUPPLY: Performed by: PLASTIC SURGERY

## 2022-03-09 PROCEDURE — 77030002933 HC SUT MCRYL J&J -A: Performed by: PLASTIC SURGERY

## 2022-03-09 PROCEDURE — 77030018836 HC SOL IRR NACL ICUM -A: Performed by: PLASTIC SURGERY

## 2022-03-09 PROCEDURE — 76210000006 HC OR PH I REC 0.5 TO 1 HR: Performed by: PLASTIC SURGERY

## 2022-03-09 PROCEDURE — 77030008462 HC STPLR SKN PROX J&J -A: Performed by: PLASTIC SURGERY

## 2022-03-09 PROCEDURE — 77030031139 HC SUT VCRL2 J&J -A: Performed by: PLASTIC SURGERY

## 2022-03-09 PROCEDURE — 76010000161 HC OR TIME 1 TO 1.5 HR INTENSV-TIER 1: Performed by: PLASTIC SURGERY

## 2022-03-09 PROCEDURE — 77030039425 HC BLD LARYNG TRULITE DISP TELE -A: Performed by: ANESTHESIOLOGY

## 2022-03-09 PROCEDURE — 77030026227 HC FUNL KELLR 2 PCH ALGN -C: Performed by: PLASTIC SURGERY

## 2022-03-09 PROCEDURE — 77030011825 HC SUPP SURG PSTOP S2SG -B: Performed by: PLASTIC SURGERY

## 2022-03-09 PROCEDURE — 74011250636 HC RX REV CODE- 250/636: Performed by: PLASTIC SURGERY

## 2022-03-09 PROCEDURE — 77030037088 HC TUBE ENDOTRACH ORAL NSL COVD-A: Performed by: ANESTHESIOLOGY

## 2022-03-09 PROCEDURE — 74011000250 HC RX REV CODE- 250: Performed by: NURSE ANESTHETIST, CERTIFIED REGISTERED

## 2022-03-09 PROCEDURE — 76210000020 HC REC RM PH II FIRST 0.5 HR: Performed by: PLASTIC SURGERY

## 2022-03-09 PROCEDURE — 74011000250 HC RX REV CODE- 250: Performed by: PLASTIC SURGERY

## 2022-03-09 DEVICE — SMOOTH ROUND ULTRA HIGH PROFILE SILICONE GEL-FILLED BREAST IMPLANT, 455CC  SMOOTH ROUND SILICONE
Type: IMPLANTABLE DEVICE | Site: BREAST | Status: FUNCTIONAL
Brand: MENTOR MEMORYGEL BREAST IMPLANT

## 2022-03-09 RX ORDER — BACITRACIN ZINC 500 UNIT/G
OINTMENT (GRAM) TOPICAL AS NEEDED
Status: DISCONTINUED | OUTPATIENT
Start: 2022-03-09 | End: 2022-03-09 | Stop reason: HOSPADM

## 2022-03-09 RX ORDER — SODIUM CHLORIDE, SODIUM LACTATE, POTASSIUM CHLORIDE, CALCIUM CHLORIDE 600; 310; 30; 20 MG/100ML; MG/100ML; MG/100ML; MG/100ML
100 INJECTION, SOLUTION INTRAVENOUS CONTINUOUS
Status: DISCONTINUED | OUTPATIENT
Start: 2022-03-09 | End: 2022-03-09 | Stop reason: HOSPADM

## 2022-03-09 RX ORDER — ROCURONIUM BROMIDE 10 MG/ML
INJECTION, SOLUTION INTRAVENOUS AS NEEDED
Status: DISCONTINUED | OUTPATIENT
Start: 2022-03-09 | End: 2022-03-09 | Stop reason: HOSPADM

## 2022-03-09 RX ORDER — ACETAMINOPHEN 325 MG/1
650 TABLET ORAL ONCE
Status: DISCONTINUED | OUTPATIENT
Start: 2022-03-09 | End: 2022-03-09 | Stop reason: HOSPADM

## 2022-03-09 RX ORDER — ONDANSETRON 2 MG/ML
4 INJECTION INTRAMUSCULAR; INTRAVENOUS
Status: DISCONTINUED | OUTPATIENT
Start: 2022-03-09 | End: 2022-03-09 | Stop reason: HOSPADM

## 2022-03-09 RX ORDER — ALBUTEROL SULFATE 0.83 MG/ML
2.5 SOLUTION RESPIRATORY (INHALATION)
Status: DISCONTINUED | OUTPATIENT
Start: 2022-03-09 | End: 2022-03-09 | Stop reason: HOSPADM

## 2022-03-09 RX ORDER — ONDANSETRON 8 MG/1
8 TABLET, ORALLY DISINTEGRATING ORAL
COMMUNITY

## 2022-03-09 RX ORDER — SCOLOPAMINE TRANSDERMAL SYSTEM 1 MG/1
1 PATCH, EXTENDED RELEASE TRANSDERMAL ONCE
COMMUNITY

## 2022-03-09 RX ORDER — NEOSTIGMINE METHYLSULFATE 1 MG/ML
INJECTION, SOLUTION INTRAVENOUS AS NEEDED
Status: DISCONTINUED | OUTPATIENT
Start: 2022-03-09 | End: 2022-03-09 | Stop reason: HOSPADM

## 2022-03-09 RX ORDER — LIDOCAINE HYDROCHLORIDE 10 MG/ML
0.1 INJECTION INFILTRATION; PERINEURAL AS NEEDED
Status: DISCONTINUED | OUTPATIENT
Start: 2022-03-09 | End: 2022-03-09 | Stop reason: HOSPADM

## 2022-03-09 RX ORDER — ACETAMINOPHEN 500 MG
TABLET ORAL
COMMUNITY

## 2022-03-09 RX ORDER — SODIUM CHLORIDE, SODIUM LACTATE, POTASSIUM CHLORIDE, CALCIUM CHLORIDE 600; 310; 30; 20 MG/100ML; MG/100ML; MG/100ML; MG/100ML
75 INJECTION, SOLUTION INTRAVENOUS CONTINUOUS
Status: DISCONTINUED | OUTPATIENT
Start: 2022-03-09 | End: 2022-03-09 | Stop reason: HOSPADM

## 2022-03-09 RX ORDER — DEXAMETHASONE SODIUM PHOSPHATE 4 MG/ML
INJECTION, SOLUTION INTRA-ARTICULAR; INTRALESIONAL; INTRAMUSCULAR; INTRAVENOUS; SOFT TISSUE AS NEEDED
Status: DISCONTINUED | OUTPATIENT
Start: 2022-03-09 | End: 2022-03-09 | Stop reason: HOSPADM

## 2022-03-09 RX ORDER — FLUMAZENIL 0.1 MG/ML
0.2 INJECTION INTRAVENOUS
Status: DISCONTINUED | OUTPATIENT
Start: 2022-03-09 | End: 2022-03-09 | Stop reason: HOSPADM

## 2022-03-09 RX ORDER — EPHEDRINE SULFATE/0.9% NACL/PF 50 MG/5 ML
SYRINGE (ML) INTRAVENOUS AS NEEDED
Status: DISCONTINUED | OUTPATIENT
Start: 2022-03-09 | End: 2022-03-09 | Stop reason: HOSPADM

## 2022-03-09 RX ORDER — MIDAZOLAM HYDROCHLORIDE 1 MG/ML
2 INJECTION, SOLUTION INTRAMUSCULAR; INTRAVENOUS
Status: DISCONTINUED | OUTPATIENT
Start: 2022-03-09 | End: 2022-03-09 | Stop reason: HOSPADM

## 2022-03-09 RX ORDER — SODIUM CHLORIDE 0.9 % (FLUSH) 0.9 %
5-40 SYRINGE (ML) INJECTION AS NEEDED
Status: DISCONTINUED | OUTPATIENT
Start: 2022-03-09 | End: 2022-03-09 | Stop reason: HOSPADM

## 2022-03-09 RX ORDER — LIDOCAINE HYDROCHLORIDE 20 MG/ML
INJECTION, SOLUTION EPIDURAL; INFILTRATION; INTRACAUDAL; PERINEURAL AS NEEDED
Status: DISCONTINUED | OUTPATIENT
Start: 2022-03-09 | End: 2022-03-09 | Stop reason: HOSPADM

## 2022-03-09 RX ORDER — CEFAZOLIN SODIUM 1 G/3ML
INJECTION, POWDER, FOR SOLUTION INTRAMUSCULAR; INTRAVENOUS AS NEEDED
Status: DISCONTINUED | OUTPATIENT
Start: 2022-03-09 | End: 2022-03-09 | Stop reason: HOSPADM

## 2022-03-09 RX ORDER — PROPOFOL 10 MG/ML
INJECTION, EMULSION INTRAVENOUS AS NEEDED
Status: DISCONTINUED | OUTPATIENT
Start: 2022-03-09 | End: 2022-03-09 | Stop reason: HOSPADM

## 2022-03-09 RX ORDER — NALOXONE HYDROCHLORIDE 0.4 MG/ML
0.2 INJECTION, SOLUTION INTRAMUSCULAR; INTRAVENOUS; SUBCUTANEOUS AS NEEDED
Status: DISCONTINUED | OUTPATIENT
Start: 2022-03-09 | End: 2022-03-09 | Stop reason: HOSPADM

## 2022-03-09 RX ORDER — SODIUM CHLORIDE 0.9 % (FLUSH) 0.9 %
5-40 SYRINGE (ML) INJECTION EVERY 8 HOURS
Status: DISCONTINUED | OUTPATIENT
Start: 2022-03-09 | End: 2022-03-09 | Stop reason: HOSPADM

## 2022-03-09 RX ORDER — ONDANSETRON 2 MG/ML
INJECTION INTRAMUSCULAR; INTRAVENOUS AS NEEDED
Status: DISCONTINUED | OUTPATIENT
Start: 2022-03-09 | End: 2022-03-09 | Stop reason: HOSPADM

## 2022-03-09 RX ORDER — LIDOCAINE HYDROCHLORIDE AND EPINEPHRINE 10; 10 MG/ML; UG/ML
INJECTION, SOLUTION INFILTRATION; PERINEURAL AS NEEDED
Status: DISCONTINUED | OUTPATIENT
Start: 2022-03-09 | End: 2022-03-09 | Stop reason: HOSPADM

## 2022-03-09 RX ORDER — BUPIVACAINE HYDROCHLORIDE 5 MG/ML
INJECTION, SOLUTION EPIDURAL; INTRACAUDAL AS NEEDED
Status: DISCONTINUED | OUTPATIENT
Start: 2022-03-09 | End: 2022-03-09 | Stop reason: HOSPADM

## 2022-03-09 RX ORDER — GLYCOPYRROLATE 0.2 MG/ML
INJECTION INTRAMUSCULAR; INTRAVENOUS AS NEEDED
Status: DISCONTINUED | OUTPATIENT
Start: 2022-03-09 | End: 2022-03-09 | Stop reason: HOSPADM

## 2022-03-09 RX ORDER — FENTANYL CITRATE 50 UG/ML
INJECTION, SOLUTION INTRAMUSCULAR; INTRAVENOUS AS NEEDED
Status: DISCONTINUED | OUTPATIENT
Start: 2022-03-09 | End: 2022-03-09 | Stop reason: HOSPADM

## 2022-03-09 RX ORDER — HYDROMORPHONE HYDROCHLORIDE 2 MG/ML
0.5 INJECTION, SOLUTION INTRAMUSCULAR; INTRAVENOUS; SUBCUTANEOUS
Status: DISCONTINUED | OUTPATIENT
Start: 2022-03-09 | End: 2022-03-09 | Stop reason: HOSPADM

## 2022-03-09 RX ORDER — OXYCODONE HYDROCHLORIDE 5 MG/1
5 TABLET ORAL
Status: DISCONTINUED | OUTPATIENT
Start: 2022-03-09 | End: 2022-03-09 | Stop reason: HOSPADM

## 2022-03-09 RX ORDER — CEFAZOLIN SODIUM/WATER 2 G/20 ML
2 SYRINGE (ML) INTRAVENOUS ONCE
Status: COMPLETED | OUTPATIENT
Start: 2022-03-09 | End: 2022-03-09

## 2022-03-09 RX ADMIN — Medication 3 MG: at 15:57

## 2022-03-09 RX ADMIN — Medication 10 MG: at 15:29

## 2022-03-09 RX ADMIN — PHENYLEPHRINE HYDROCHLORIDE 50 MCG: 10 INJECTION INTRAVENOUS at 15:29

## 2022-03-09 RX ADMIN — Medication 2 G: at 15:03

## 2022-03-09 RX ADMIN — FENTANYL CITRATE 50 MCG: 50 INJECTION INTRAMUSCULAR; INTRAVENOUS at 15:11

## 2022-03-09 RX ADMIN — LIDOCAINE HYDROCHLORIDE 80 MG: 20 INJECTION, SOLUTION EPIDURAL; INFILTRATION; INTRACAUDAL; PERINEURAL at 14:58

## 2022-03-09 RX ADMIN — SODIUM CHLORIDE, SODIUM LACTATE, POTASSIUM CHLORIDE, AND CALCIUM CHLORIDE: 600; 310; 30; 20 INJECTION, SOLUTION INTRAVENOUS at 14:49

## 2022-03-09 RX ADMIN — PHENYLEPHRINE HYDROCHLORIDE 50 MCG: 10 INJECTION INTRAVENOUS at 15:09

## 2022-03-09 RX ADMIN — ROCURONIUM BROMIDE 40 MG: 50 INJECTION, SOLUTION INTRAVENOUS at 14:58

## 2022-03-09 RX ADMIN — Medication 10 MG: at 15:07

## 2022-03-09 RX ADMIN — ONDANSETRON 4 MG: 2 INJECTION INTRAMUSCULAR; INTRAVENOUS at 15:07

## 2022-03-09 RX ADMIN — SODIUM CHLORIDE, SODIUM LACTATE, POTASSIUM CHLORIDE, AND CALCIUM CHLORIDE 100 ML/HR: 600; 310; 30; 20 INJECTION, SOLUTION INTRAVENOUS at 13:26

## 2022-03-09 RX ADMIN — FENTANYL CITRATE 50 MCG: 50 INJECTION INTRAMUSCULAR; INTRAVENOUS at 14:56

## 2022-03-09 RX ADMIN — DEXAMETHASONE SODIUM PHOSPHATE 10 MG: 4 INJECTION, SOLUTION INTRAMUSCULAR; INTRAVENOUS at 15:07

## 2022-03-09 RX ADMIN — GLYCOPYRROLATE 0.4 MG: 0.2 INJECTION, SOLUTION INTRAMUSCULAR; INTRAVENOUS at 15:57

## 2022-03-09 RX ADMIN — PROPOFOL 200 MG: 10 INJECTION, EMULSION INTRAVENOUS at 14:58

## 2022-03-09 NOTE — ANESTHESIA PREPROCEDURE EVALUATION
Relevant Problems   PERSONAL HX & FAMILY HX OF CANCER   (+) Breast cancer ([facility] [address]       Anesthetic History   No history of anesthetic complications            Review of Systems / Medical History  Patient summary reviewed, nursing notes reviewed and pertinent labs reviewed    Pulmonary  Within defined limits                 Neuro/Psych         Psychiatric history     Cardiovascular  Within defined limits                Exercise tolerance: >4 METS     GI/Hepatic/Renal  Within defined limits              Endo/Other      Hypothyroidism: well controlled  Cancer (breast)     Other Findings   Comments: Hard of hearing - patient is deaf and reads lips           Physical Exam    Airway  Mallampati: II  TM Distance: 4 - 6 cm  Neck ROM: normal range of motion   Mouth opening: Normal     Cardiovascular  Regular rate and rhythm,  S1 and S2 normal,  no murmur, click, rub, or gallop  Rhythm: regular  Rate: normal      Pertinent negatives: No murmur   Dental  No notable dental hx       Pulmonary  Breath sounds clear to auscultation               Abdominal         Other Findings            Anesthetic Plan    ASA: 2  Anesthesia type: general          Induction: Intravenous  Anesthetic plan and risks discussed with: Patient      Patient reads lips - 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

## 2022-03-09 NOTE — PERIOP NOTES
Dr. Douglas Trinidad: pls call pt's  Robinson Gaitan at [phone] to give update after surgery.  He will then come to the hospital for discharge instructions and to  patient

## 2022-03-09 NOTE — ANESTHESIA POSTPROCEDURE EVALUATION
Procedure(s):  BILATERAL BREAST TISSUE EXPANDER EXCHANGE TO PERMANENT IMPLANTS. general    Anesthesia Post Evaluation      Multimodal analgesia: multimodal analgesia used between 6 hours prior to anesthesia start to PACU discharge  Patient location during evaluation: bedside  Patient participation: complete - patient participated  Level of consciousness: awake  Pain management: adequate  Airway patency: patent  Anesthetic complications: no  Cardiovascular status: acceptable  Respiratory status: spontaneous ventilation and acceptable  Hydration status: acceptable  Post anesthesia nausea and vomiting:  none      INITIAL Post-op Vital signs:   Vitals Value Taken Time   /89 03/09/22 1656   Temp 36.9 °C (98.4 °F) 03/09/22 1625   Pulse 75 03/09/22 1659   Resp 18 03/09/22 1700   SpO2 96 % 03/09/22 1658   Vitals shown include unvalidated device data.

## 2022-03-09 NOTE — DISCHARGE INSTRUCTIONS
Follow up next week.  Keep dressing on for 48 hours, then may shower.  Apply bacitracin and gauze daily. MEDICATION INTERACTION:  During your procedure you potentially received a medication or medications which may reduce the effectiveness of oral contraceptives. Please consider other forms of contraception for 1 month following your procedure if you are currently using oral contraceptives as your primary form of birth control. In addition to this, we recommend continuing your oral contraceptive as prescribed, unless otherwise instructed by your physician, during this time    After general anesthesia or intravenous sedation, for 24 hours or while taking prescription Narcotics:  · Limit your activities  · A responsible adult needs to be with you for the next 24 hours  · Do not drive and operate hazardous machinery  · Do not make important personal or business decisions  · Do not drink alcoholic beverages  · If you have not urinated within 8 hours after discharge, please contact your surgeon on call. · If you have sleep apnea and have a CPAP machine, please use it for all naps and sleeping. · Please use caution when taking narcotics and any of your home medications that may cause drowsiness. *  Please give a list of your current medications to your Primary Care Provider. *  Please update this list whenever your medications are discontinued, doses are      changed, or new medications (including over-the-counter products) are added. *  Please carry medication information at all times in case of emergency situations. These are general instructions for a healthy lifestyle:  No smoking/ No tobacco products/ Avoid exposure to second hand smoke  Surgeon General's Warning:  Quitting smoking now greatly reduces serious risk to your health.   Obesity, smoking, and sedentary lifestyle greatly increases your risk for illness  A healthy diet, regular physical exercise & weight monitoring are important for maintaining a healthy lifestyle    You may be retaining fluid if you have a history of heart failure or if you experience any of the following symptoms:  Weight gain of 3 pounds or more overnight or 5 pounds in a week, increased swelling in our hands or feet or shortness of breath while lying flat in bed. Please call your doctor as soon as you notice any of these symptoms; do not wait until your next office visit.

## 2022-03-09 NOTE — BRIEF OP NOTE
Brief Postoperative Note    Patient: Evelina Olsen  YOB: 1960  MRN: [id number]    Date of Procedure: 3/9/2022     Pre-Op Diagnosis: Personal history of breast cancer [Z85.3]    Post-Op Diagnosis: Same     Procedure(s):  BILATERAL BREAST TISSUE EXPANDER EXCHANGE TO PERMANENT IMPLANTS    Surgeon(s):  Aye Thurman MD    Surgical Assistant: None    Anesthesia: General     Estimated Blood Loss (mL): 74QQ    Complications: none    Specimens: * No specimens in log *     Implants:   Implant Name Type Inv.  Item Serial No.  Lot No. LRB No. Used Action   IMPLANT BRST 455CC DIA11.2CM P5.7CM ARA GEL SMOOTH RND [place] - [id number]  IMPLANT BRST 455CC DIA11.2CM P5.7CM ARA GEL SMOOTH RND [place] [id number] [id number] Right 1 Implanted   IMPLANT BRST 455CC DIA11.2CM P5.7CM ARA GEL SMOOTH RND [place] - [id number]  IMPLANT BRST 455CC DIA11.2CM P5.7CM ARA GEL SMOOTH RND HI [id number] [employer] mobile [employer] g[employer]_[id number] Left 1 Implanted       Drains: * No LDAs found *    Findings: none    Electronically Signed by Sapna Sanchez MD on 3/9/2022 at 3:58 PM

## 2022-03-15 NOTE — OP NOTES
[address] [address]  OPERATIVE REPORT    Name:  Sweta Schultz  MR#:  [id number]  :  1960  ACCOUNT #:  [de-identified]  DATE OF SERVICE:  2022    PREOPERATIVE DIAGNOSIS:  History of breast cancer. POSTOPERATIVE DIAGNOSIS:  History of breast cancer. PROCEDURE PERFORMED:  Bilateral removal of tissue expanders with placement of permanent gel implants. SURGEON:  Dany Diaz MD    ASSISTANT:  Kiran CALI    ANESTHESIA:  General.    COMPLICATIONS:  None. SPECIMENS REMOVED:  None. IMPLANTS:  As below    ESTIMATED BLOOD LOSS:  25 mL. INDICATION:  The patient presents with a history of breast cancer. She underwent mastectomy and tissue expander placement and is now ready for her second stage of reconstruction. PROCEDURE:  After informed consent was obtained from the patient, she was marked in the holding area in the upright position. She was then taken to the operating room, placed in the supine position, and prepped and draped in the usual fashion. A portion of her vertical incision was opened on the left side, this was done with a 10 blade followed by Bovie cautery and her tissue expander was deflated and removed. Capsulotomies were performed superiorly and medially to facilitate pocket expansion and shape. Once that was completed, then a sizer was placed to determine the best fit for the patient. Ultimately, a 455 ultra high profile smooth round gel device from [place] fit the best.  Next, I performed the same procedure on the right side with the sizer in place, the same size fit there as well and then she was brought to an upright position and examined for symmetry. She was found to have a very symmetric result. She was then placed back in the supine position and the sizers were removed. The pockets were irrigated with antibiotic irrigation and reinspected for hemostasis and the skin was reprepped with Betadine. I also put 25 mL of 0.25% Marcaine in each pocket.   Next then the implants were placed. These were 455 smooth round ultra high profile gel implants from [place]. The implants were soaked in antibiotic irrigation, placed into the Gonzalez funnel and into the subpectoral pocket. The incisions were then closed in three layers with 3-0 Vicryl through the capsule and the muscle, another layer in the subdermal layer and then a running 4-0 Monocryl subcuticular stitch. The patient was closed in the same fashion on both sides. She was then dressed with Xeroform, bacitracin, gauze, ABD pads and surgical bra. She tolerated the procedure very well. She was escorted to recovery room in stable condition.       Parker Agarwal MD      [id number]  D:  03/15/2022 14:17  T:  03/15/2022 19:44  JOB #:  [id number]

## 2022-03-18 PROBLEM — D05.11 DUCTAL CARCINOMA IN SITU (DCIS) OF RIGHT BREAST: Status: ACTIVE | Noted: 2021-11-23

## 2022-03-19 PROBLEM — C50.919 BREAST CANCER (HCC): Status: ACTIVE | Noted: 2021-10-14

## 2022-08-29 DIAGNOSIS — C50.211 MALIGNANT NEOPLASM OF UPPER-INNER QUADRANT OF RIGHT FEMALE BREAST, UNSPECIFIED ESTROGEN RECEPTOR STATUS (HCC): Primary | ICD-10-CM

## 2022-08-30 NOTE — PROGRESS NOTES
[facility] Hematology and Oncology: Established patient - follow up     Chief Complaint   Patient presents with    Follow-up      Reason for Referral: Ductal carcinoma of breast, right; Developmental breast asymmetry; Family history of breast  cancer   Referring Provider:  Álvaro Rolle MD   Primary Care Provider: Jacqulin Olszewski, NP   Family History of Cancer/Hematologic Disorders: Mother and sister with breast cancer   Presenting Symptoms: abnormal routine screening mammogram    History of Present Illness:  Ms. Esqueda is a 63 y.o. female who presents today for FU regarding breast cancer. The past medical history is significant for menopause, hypothyroidism, IBS and [place]. Sxhx: breast biopsy, bilateral mastectomy and breast reconstruction. In June 2021, Ms. Esqueda presented for her routine screening mammogram.  She does note a family history of breast cancer however she denies cancer herself. Her 6/2021 imaging reported suspicious calcifications in the right breast . In 7/2021, she had a right breast digital mammogram that reported an increasing indeterminate calcification at the 12:00 position in the right breast.  On 7/30/21 she underwent a core needle  biopsy of right breast.  The pathology reported one biopsy as benign however the other biopsy reported invasive ductal carcinoma, jason  grade I/III with associated microcalcifications with low grade ductal carcinoma in situ, cribriform type. The IHC staining of this specimen was unable to be performed secondary to no residual tumor  in specimen. On 8/26/21, Ms. Esqueda had a bilateral breast MRI that reported a 2.2 cm enhancing lesion in the superior right breast consistent  with primary breast tumor and no evidence of further malignancy. On 10/14/21, Ms. Esqueda underwent a bilateral breast mastectomy with right sentinel lymph node biopsy by Dr. Brooks Fowler .   The pathology reported left breast as fibrocystic mastopathy; adenosis; benign ductal epithelial hyperplasia of the usual type. Pathology of the right breast reported residual ductal carcinoma  in situ, intermediate grade, 11 mm in greatest dimension, 10 mm from the anterior margin; changes consistent with prior biopsy site and no  residual invasive tumor was identified. Both sentinel nodes were negative for tumor. The  right breast IHC staining reported ER (99%) and IN (97%) as positive. Sees Dr. Eloisa Ortega for reconstruction process. A referral was placed  to medical oncology for next steps in plan of care for her newly diagnosed right breast cancer. never used tobacco products   hard of hearing and reads lips thus with pandemic and mask wearing, communication is challenging      At consultation, we had a lengthy discussion re the pathophysiology of breast cancer, staging, and the importance of receptor status in terms of treatment options. We then reviewed her medical history as well as oncologic history, recent imaging and pathology in detail. We also discussed tx options going forward and  surveillance. Today, she is here for FU. Last seen in fall of 2021. In the interim, she completed implant exchange. she has some pains over her chest wall sometimes, thinks this is related to sx/implants, does not think it's hear related. The chest itches over sternum, she rubs it and then it goes away, no associated rash. Advised to FU with PCP/sx. She is here for FU. No acute issues. concerns. Labs reviewed.  is here with her. Chronological Events:   6/11/21 Bilateral Digital Screening Mammogram   IMPRESSION   1. Right breast calcifications. 2. The patient's calculated five-year and lifetime risk scores for developing   breast carcinoma were 4.4 and 22.0%, respectively.  In the setting of   heterogeneously dense breast tissue, breast MRI would be recommended to evaluate   for mammographically occult neoplasm       7/16/21 Right Digital Mammogram   IMPRESSION   Increasing indeterminate calcification at 12:00 in the right breast.    Stereotactic biopsy is recommended       7/30/21 [facility] Surgical Pathology   A:  \"RIGHT BREAST CALCIFICATIONS AT 12 O'CLOCK\":             BENIGN BREAST TISSUE. DEFINITIVE MICROCALCIFICATIONS ARE NOT IDENTIFIED. B:  \"RIGHT BREAST CALCIFICATIONS  AT 12 O'CLOCK\":             INVASIVE DUCTAL CARCINOMA, DANNY GRADE I/III WITH ASSOCIATED                  MICROCALCIFICATIONS.             LOW GRADE DUCTAL CARCINOMA  IN SITU, CRIBRIFORM TYPE.             SEE COMMENT. Comment       The needle core biopsy fragments demonstrate invasive ductal   carcinoma (maximum dimension of 1.5 mm in one core) with an overall Danny   grade I of III (tubule formation 1, nuclear pleomorphism 1, mitotic count    1). Breast prognostic indicators have been ordered and results will be   reported in an addendum. STF-IMMUNOHISTOCHEMISTRY                          Interpretation                        Immunohistochemical Stain Panel:        Interpretation:  P63 staining for myoepithelial cells is lost in area   of invasive carcinoma. Antibody/Test               Marker  For                              Result   p63                             Myoepithelial cells                          See  interpretation                          Interpretation                       [employer] IHC Quantitative Breast Panel   Tests not performed as no residual  carcinoma identified in specimen. 8/26/21 Bilateral breast MRI   FINDINGS:   -BACKGROUND: No significant background enhancement. -RIGHT BREAST: There are biopsy changes in the lateral right breast enhancement   extending medially and anteriorly from the biopsy site. Total extent of   enhancement is approximately 2.2 cm. No other enhancing masses are seen in the   right breast.   -LEFT BREAST: No areas of abnormal enhancement. No significant cystic change.    -AXILLA: No significant adenopathy. -CHEST WALL: No chest wall lesions. -OTHER: No other significant findings. IMPRESSION   2.2 cm enhancing lesion in the superior right breast consistent with   primary breast tumor. No other evidence of malignancy. 9/2/21 tumor board     10/14/21 [facility] Surgical Pathology   A:  \"LEFT BREAST\":  FIBROCYSTIC MASTOPATHY; ADENOSIS; BENIGN DUCTAL   EPITHELIAL HYPERPLASIA OF THE USUAL TYPE.        B:  \"RIGHT BREAST\":  RESIDUAL DUCTAL CARCINOMA IN SITU, INTERMEDIATE   GRADE, 11 MM IN GREATEST DIMENSION,  10 MM FROM THE ANTERIOR MARGIN;   CHANGES CONSISTENT WITH PRIOR BIOPSY SITE; NO RESIDUAL INVASIVE TUMOR   IDENTIFIED, SEE COMMENT. C:  \"#1 SENTINEL NODE\":  BENIGN LYMPH NODE, NEGATIVE FOR TUMOR (0/1). D:   #2 SENTINEL NODE\":  BENIGN LYMPH NODE, NEGATIVE FOR TUMOR (0/1). Comment       The right breast shows no residual invasive tumor on which to perform   breast prognostic indicator studies. As there is residual ductal    carcinoma in situ, ER and HI will be performed, but no Her2 staining will   be performed. [facility]- ER/HI procedure                                                                   Interpretation                       [employer] IHC  Quantitative Breast Panel     TEST NAME:                                           RESULTS:               INTERNAL CONTROLS:     ESTROGEN RECEPTOR:                       Positive (99%)               Present, Positive   PROGESTERONE RECEPTOR:           Positive (97%)               Present,  Positive       10/21/21 tumor board    11/17/21 heme/onc consultation   3/9/22 sx: BILATERAL BREAST TISSUE EXPANDER EXCHANGE TO PERMANENT IMPLANTS.  Dr Leyda Toledo   8/2022 FU - doing well, no breast related concerns        Family History   Problem Relation Age of Onset    Breast Cancer Sister 58    Breast Cancer Mother 79      Social History     Socioeconomic History    Marital status:      Spouse name: None    Number of children: None    Years of education: None    Highest education level: None   Tobacco Use    Smoking status: Former     Packs/day: 0.25     Types: Cigarettes     Quit date: 3/3/2019     Years since quitting: 3.4    Smokeless tobacco: Never   Substance and Sexual Activity    Alcohol use: Not Currently    Drug use: Not Currently        Review of Systems   Constitutional:  Negative for appetite change, chills, diaphoresis, fatigue, fever and unexpected weight change. HENT:   Positive for hearing loss. Negative for mouth sores, nosebleeds, sore throat, trouble swallowing and voice change. Eyes:  Negative for icterus. Respiratory:  Negative for chest tightness, hemoptysis, shortness of breath and wheezing. Cardiovascular:  Negative for chest pain, leg swelling and palpitations. Gastrointestinal:  Negative for abdominal distention, abdominal pain, blood in stool, constipation, diarrhea, nausea and vomiting. Endocrine: Negative for hot flashes. Genitourinary:  Negative for difficulty urinating, frequency, vaginal bleeding and vaginal discharge. Musculoskeletal:  Negative for arthralgias, flank pain, gait problem and myalgias. Skin:  Negative for itching, rash and wound. Neurological:  Negative for dizziness, extremity weakness, gait problem, headaches and numbness. Psychiatric/Behavioral:  Positive for depression. Negative for confusion. The patient is nervous/anxious.        No Known Allergies  Past Medical History:   Diagnosis Date    Breast cancer ([address]     Right     Hearing loss     Meningitis as a child, pt uses closed captioning on phone     Hypothyroidism     Menopause      Past Surgical History:   Procedure Laterality Date    BREAST RECONSTRUCTION Bilateral 3/9/2022    BILATERAL BREAST TISSUE EXPANDER EXCHANGE TO PERMANENT IMPLANTS performed by Rohith Santos MD at [address] [place] [address] Dr Gaytan 10/14/2021    BILATERAL BREAST TISSUE EXPANDER INSERTION performed by Latonia Jones, Opal Gregory MD at [facility] MAIN OR    MARTITA BIOPSY BREAST STEREOTACTIC Right 07/30/2021    MARTITA BIOPSY BREAST STEREOTACTIC Left 2014    MARTITA STEROTACTIC LOC BREAST BIOPSY RIGHT Right 7/30/2021    MARTITA STEROTACTIC LOC BREAST BIOPSY RIGHT 7/30/2021 [facility]    MASTECTOMY Bilateral 10/14/2021    BILATERAL BREAST MASTECTOMY performed by Jannette Arriaza MD at [facility] MAIN OR     Current Outpatient Medications   Medication Sig Dispense Refill    Biotin 5 MG CAPS Take 1 tablet by mouth daily      diazePAM (VALIUM) 10 MG tablet TAKE 1 TABLET BY MOUTH DAILY AS NEEDED FOR ANXIETY      FLUoxetine (PROZAC) 40 MG capsule TAKE 1 CAPSULE BY MOUTH EVERY DAY      vitamin D (CHOLECALCIFEROL) 25 MCG (1000 UT) TABS tablet Take 2,000 Units by mouth daily      thyroid (ARMOUR) 30 MG tablet Take 30 mg by mouth daily       No current facility-administered medications for this visit. No flowsheet data found. OBJECTIVE:  /68   Pulse 62   Temp 97.6 °F (36.4 °C)   Resp 16   Ht 5' 6\" (1.676 m)   Wt 142 lb 12.8 oz (64.8 kg)   SpO2 98%   BMI 23.05 kg/m²       ECOG PERFORMANCE STATUS - 0-Fully active, able to carry on all pre-disease performance without restriction. Pain - 0 - No pain/10. None/Minimal pain - not affecting QOL     Fatigue - No flowsheet data found. Distress - No flowsheet data found. Physical Exam  Vitals reviewed. Exam conducted with a chaperone present. Constitutional:       General: She is not in acute distress. Appearance: Normal appearance. She is normal weight. She is not ill-appearing or toxic-appearing. HENT:      Head: Normocephalic and atraumatic. Nose: Nose normal.      Mouth/Throat:      Mouth: Mucous membranes are moist.   Eyes:      General: No scleral icterus. Extraocular Movements: Extraocular movements intact. Conjunctiva/sclera: Conjunctivae normal.      Pupils: Pupils are equal, round, and reactive to light.    Cardiovascular:      Rate and Rhythm: Normal - she will think about genetic testing and let us know if she's interested in pursuing   - AST mildly increased - she does not drink alcohol; will increase fluid intake and FU with pcp         RESUSCITATION DIRECTIVES/HOSPICE CARE: Full Support      RTC 6mo or sooner as needed       MDM  Number of Diagnoses or Management Options  Ductal carcinoma in situ (DCIS) of right breast: established, improving  Malignant neoplasm of right female breast, unspecified estrogen receptor status, unspecified site of breast ([facility] [address]): established, improving     Amount and/or Complexity of Data Reviewed  Clinical lab tests: ordered and reviewed  Tests in the medicine section of CPT®: ordered  Obtain history from someone other than the patient: yes  Review and summarize past medical records: yes  Independent visualization of images, tracings, or specimens: yes    Risk of Complications, Morbidity, and/or Mortality  Presenting problems: moderate  Diagnostic procedures: low  Management options: moderate        Lab studies and imaging studies were personally reviewed. Pertinent old records were reviewed. Historical:   - During today's visit we discussed the pathophysiology of breast cancer, staging, and the importance of receptor status in terms of treatment options. We then reviewed her medical history as well  as oncologic history, recent imaging and pathology in detail. The invasive component was ~1.5mm and too small to run receptor status per lab. Rest of disease at time of mastectomy was DCIS per above. Patients with DCIS undergo local treatment with  either mastectomy or BCT followed by adjuvant radiation (unless low-risk disease). The decision to administer endocrine therapy for risk reduction of subsequent cancers depends on choice of local therapy and receptor status. Five years of endocrine  therapy is offered to women with ER/AZ positive disease who undergo unilateral mastectomy or BCT.   There is no role for adjuvant endocrine  therapy for women who undergo bilateral mastectomies. We then reviewed the data behind relative efficacy of mastectomy vs BCT briefly, as she discussed this with surgery. Mastectomy is curative in over 98% of pts with DCIS, with disease recurrence being  rare at 1-2%. Some women choose contralateral prophylactic mastectomy to prevent future breast cancer in contralateral breast.  There is no proven survival benefit. The primary role of systemic therapy is risk reduction of invasive breast cancer in  ipsi/contralateral breast.  Chemotherapy plays no role in management of DCIS. About 50-75% of DCIS lesions express ER/CT. Tamoxifen is approved for adjuvant therapy to prevent invasive breast cancer recurrence in women with DCIS, but AI is also an acceptable  option. We did discuss option of prophylactic endocrine therapy but she expressed that she underwent bilat mastectomies to be able to avoid XRT/systemic tx. We discusses small possibility of recurrent disease (could be distant) and she VU and accepted  risks. We also discussed that chemoprevention does not prevent ER neg disease. SE of endocrine tx also reviewed. Discussed NCCN guidelines for FU and she felt that the apts were too frequent. She stated that she may choose to FU with her PCP     All questions were asked and answered to the best of my ability. The patient verbalized understanding and agrees with the plan above.               Melecio Osborne, [facility] Hematology [facility]  [address] [place] [address]  Office : [phone]  Fax : [phone]

## 2022-08-31 ENCOUNTER — OFFICE VISIT (OUTPATIENT)
Dept: ONCOLOGY | Age: 62
End: 2022-08-31
Payer: COMMERCIAL

## 2022-08-31 ENCOUNTER — HOSPITAL ENCOUNTER (OUTPATIENT)
Dept: LAB | Age: 62
Discharge: HOME | End: 2022-09-03
Payer: COMMERCIAL

## 2022-08-31 VITALS
RESPIRATION RATE: 16 BRPM | TEMPERATURE: 97.6 F | SYSTOLIC BLOOD PRESSURE: 112 MMHG | BODY MASS INDEX: 22.95 KG/M2 | HEART RATE: 62 BPM | DIASTOLIC BLOOD PRESSURE: 68 MMHG | WEIGHT: 142.8 LBS | HEIGHT: 66 IN | OXYGEN SATURATION: 98 %

## 2022-08-31 DIAGNOSIS — C50.911 MALIGNANT NEOPLASM OF RIGHT FEMALE BREAST, UNSPECIFIED ESTROGEN RECEPTOR STATUS, UNSPECIFIED SITE OF BREAST (HCC): Primary | ICD-10-CM

## 2022-08-31 DIAGNOSIS — C50.211 MALIGNANT NEOPLASM OF UPPER-INNER QUADRANT OF RIGHT FEMALE BREAST, UNSPECIFIED ESTROGEN RECEPTOR STATUS (HCC): ICD-10-CM

## 2022-08-31 DIAGNOSIS — D05.11 DUCTAL CARCINOMA IN SITU (DCIS) OF RIGHT BREAST: ICD-10-CM

## 2022-08-31 LAB
ALBUMIN SERPL-MCNC: 4.5 G/DL (ref 3.2–4.6)
ALBUMIN/GLOB SERPL: 1.3 {RATIO} (ref 1.2–3.5)
ALP SERPL-CCNC: 113 U/L (ref 50–136)
ALT SERPL-CCNC: 56 U/L (ref 12–65)
ANION GAP SERPL CALC-SCNC: 3 MMOL/L (ref 4–13)
AST SERPL-CCNC: 47 U/L (ref 15–37)
BASOPHILS # BLD: 0 K/UL (ref 0–0.2)
BASOPHILS NFR BLD: 0 % (ref 0–2)
BILIRUB SERPL-MCNC: 0.6 MG/DL (ref 0.2–1.1)
BUN SERPL-MCNC: 14 MG/DL (ref 8–23)
CALCIUM SERPL-MCNC: 9.8 MG/DL (ref 8.3–10.4)
CHLORIDE SERPL-SCNC: 104 MMOL/L (ref 101–110)
CO2 SERPL-SCNC: 32 MMOL/L (ref 21–32)
CREAT SERPL-MCNC: 0.8 MG/DL (ref 0.6–1)
DIFFERENTIAL METHOD BLD: ABNORMAL
EOSINOPHIL # BLD: 0.1 K/UL (ref 0–0.8)
EOSINOPHIL NFR BLD: 1 % (ref 0.5–7.8)
ERYTHROCYTE [DISTWIDTH] IN BLOOD BY AUTOMATED COUNT: 12.9 % (ref 11.9–14.6)
GLOBULIN SER CALC-MCNC: 3.5 G/DL (ref 2.3–3.5)
GLUCOSE SERPL-MCNC: 112 MG/DL (ref 65–100)
HCT VFR BLD AUTO: 43.5 % (ref 35.8–46.3)
HGB BLD-MCNC: 14.5 G/DL (ref 11.7–15.4)
IMM GRANULOCYTES # BLD AUTO: 0 K/UL (ref 0–0.5)
IMM GRANULOCYTES NFR BLD AUTO: 0 % (ref 0–5)
LYMPHOCYTES # BLD: 1.7 K/UL (ref 0.5–4.6)
LYMPHOCYTES NFR BLD: 21 % (ref 13–44)
MCH RBC QN AUTO: 30.9 PG (ref 26.1–32.9)
MCHC RBC AUTO-ENTMCNC: 33.3 G/DL (ref 31.4–35)
MCV RBC AUTO: 92.6 FL (ref 79.6–97.8)
MONOCYTES # BLD: 0.4 K/UL (ref 0.1–1.3)
MONOCYTES NFR BLD: 5 % (ref 4–12)
NEUTS SEG # BLD: 6.1 K/UL (ref 1.7–8.2)
NEUTS SEG NFR BLD: 73 % (ref 43–78)
NRBC # BLD: 0 K/UL (ref 0–0.2)
PLATELET # BLD AUTO: 287 K/UL (ref 150–450)
PMV BLD AUTO: 9.3 FL (ref 9.4–12.3)
POTASSIUM SERPL-SCNC: 3.9 MMOL/L (ref 3.5–5.1)
PROT SERPL-MCNC: 8 G/DL (ref 6.3–8.2)
RBC # BLD AUTO: 4.7 M/UL (ref 4.05–5.2)
SODIUM SERPL-SCNC: 139 MMOL/L (ref 136–145)
WBC # BLD AUTO: 8.4 K/UL (ref 4.3–11.1)

## 2022-08-31 PROCEDURE — 99214 OFFICE O/P EST MOD 30 MIN: CPT | Performed by: INTERNAL MEDICINE

## 2022-08-31 PROCEDURE — 85025 COMPLETE CBC W/AUTO DIFF WBC: CPT

## 2022-08-31 PROCEDURE — 80053 COMPREHEN METABOLIC PANEL: CPT

## 2022-08-31 PROCEDURE — 36415 COLL VENOUS BLD VENIPUNCTURE: CPT

## 2022-08-31 RX ORDER — FLUOXETINE HYDROCHLORIDE 40 MG/1
CAPSULE ORAL
COMMUNITY
Start: 2022-08-04

## 2022-08-31 RX ORDER — DIAZEPAM 10 MG/1
TABLET ORAL
COMMUNITY
Start: 2022-08-04

## 2022-08-31 RX ORDER — DIPHENHYDRAMINE HYDROCHLORIDE 25 MG/1
1 TABLET ORAL DAILY
COMMUNITY

## 2022-08-31 ASSESSMENT — ENCOUNTER SYMPTOMS
VOICE CHANGE: 0
CONSTIPATION: 0
SCLERAL ICTERUS: 0
DIARRHEA: 0
SHORTNESS OF BREATH: 0
ABDOMINAL PAIN: 0
SORE THROAT: 0
ABDOMINAL DISTENTION: 0
TROUBLE SWALLOWING: 0
NAUSEA: 0
CHEST TIGHTNESS: 0
HEMOPTYSIS: 0
WHEEZING: 0
BLOOD IN STOOL: 0
VOMITING: 0

## 2022-08-31 ASSESSMENT — PATIENT HEALTH QUESTIONNAIRE - PHQ9
1. LITTLE INTEREST OR PLEASURE IN DOING THINGS: 0
4. FEELING TIRED OR HAVING LITTLE ENERGY: 0
7. TROUBLE CONCENTRATING ON THINGS, SUCH AS READING THE NEWSPAPER OR WATCHING TELEVISION: 0
5. POOR APPETITE OR OVEREATING: 0
6. FEELING BAD ABOUT YOURSELF - OR THAT YOU ARE A FAILURE OR HAVE LET YOURSELF OR YOUR FAMILY DOWN: 0
2. FEELING DOWN, DEPRESSED OR HOPELESS: 0
SUM OF ALL RESPONSES TO PHQ QUESTIONS 1-9: 0
SUM OF ALL RESPONSES TO PHQ QUESTIONS 1-9: 0
9. THOUGHTS THAT YOU WOULD BE BETTER OFF DEAD, OR OF HURTING YOURSELF: 0
3. TROUBLE FALLING OR STAYING ASLEEP: 0
SUM OF ALL RESPONSES TO PHQ QUESTIONS 1-9: 0
8. MOVING OR SPEAKING SO SLOWLY THAT OTHER PEOPLE COULD HAVE NOTICED. OR THE OPPOSITE, BEING SO FIGETY OR RESTLESS THAT YOU HAVE BEEN MOVING AROUND A LOT MORE THAN USUAL: 0
SUM OF ALL RESPONSES TO PHQ QUESTIONS 1-9: 0
SUM OF ALL RESPONSES TO PHQ9 QUESTIONS 1 & 2: 0

## 2022-08-31 ASSESSMENT — ANXIETY QUESTIONNAIRES
4. TROUBLE RELAXING: 0
2. NOT BEING ABLE TO STOP OR CONTROL WORRYING: 1
6. BECOMING EASILY ANNOYED OR IRRITABLE: 1
GAD7 TOTAL SCORE: 3
3. WORRYING TOO MUCH ABOUT DIFFERENT THINGS: 1
IF YOU CHECKED OFF ANY PROBLEMS ON THIS QUESTIONNAIRE, HOW DIFFICULT HAVE THESE PROBLEMS MADE IT FOR YOU TO DO YOUR WORK, TAKE CARE OF THINGS AT HOME, OR GET ALONG WITH OTHER PEOPLE: SOMEWHAT DIFFICULT
5. BEING SO RESTLESS THAT IT IS HARD TO SIT STILL: 0
1. FEELING NERVOUS, ANXIOUS, OR ON EDGE: 0
7. FEELING AFRAID AS IF SOMETHING AWFUL MIGHT HAPPEN: 0

## 2022-08-31 NOTE — PATIENT INSTRUCTIONS
Patient Instructions from Today's Visit    Reason for Visit:  Follow up visit for breast cancer      Plan:    Doing well. One of your liver labs is slightly elevated. Drink plenty of water. Follow up with your PCP in about a month or so for this.     Follow Up:  - 6 months    Recent Lab Results:     Hospital Outpatient Visit on 08/31/2022   Component Date Value Ref Range Status    WBC 08/31/2022 8.4  4.3 - 11.1 K/uL Final    RBC 08/31/2022 4.70  4.05 - 5.2 M/uL Final    Hemoglobin 08/31/2022 14.5  11.7 - 15.4 g/dL Final    Hematocrit 08/31/2022 43.5  35.8 - 46.3 % Final    MCV 08/31/2022 92.6  79.6 - 97.8 FL Final    MCH 08/31/2022 30.9  26.1 - 32.9 PG Final    MCHC 08/31/2022 33.3  31.4 - 35.0 g/dL Final    RDW 08/31/2022 12.9  11.9 - 14.6 % Final    Platelets 14/99/1798 287  150 - 450 K/uL Final    MPV 08/31/2022 9.3 (A) 9.4 - 12.3 FL Final    nRBC 08/31/2022 0.00  0.0 - 0.2 K/uL Final    **Note: Absolute NRBC parameter is now reported with Hemogram**    Seg Neutrophils 08/31/2022 73  43 - 78 % Final    Lymphocytes 08/31/2022 21  13 - 44 % Final    Monocytes 08/31/2022 5  4.0 - 12.0 % Final    Eosinophils % 08/31/2022 1  0.5 - 7.8 % Final    Basophils 08/31/2022 0  0.0 - 2.0 % Final    Immature Granulocytes 08/31/2022 0  0.0 - 5.0 % Final    Segs Absolute 08/31/2022 6.1  1.7 - 8.2 K/UL Final    Absolute Lymph # 08/31/2022 1.7  0.5 - 4.6 K/UL Final    Absolute Mono # 08/31/2022 0.4  0.1 - 1.3 K/UL Final    Absolute Eos # 08/31/2022 0.1  0.0 - 0.8 K/UL Final    Basophils Absolute 08/31/2022 0.0  0.0 - 0.2 K/UL Final    Absolute Immature Granulocyte 08/31/2022 0.0  0.0 - 0.5 K/UL Final    Differential Type 08/31/2022 AUTOMATED    Final    Sodium 08/31/2022 139  136 - 145 mmol/L Final    Potassium 08/31/2022 3.9  3.5 - 5.1 mmol/L Final    Chloride 08/31/2022 104  101 - 110 mmol/L Final    CO2 08/31/2022 32  21 - 32 mmol/L Final    Anion Gap 08/31/2022 3 (A) 4 - 13 mmol/L Final    Glucose 08/31/2022 112 (A) 65 - 100 mg/dL Final    BUN 08/31/2022 14  8 - 23 MG/DL Final    Creatinine 08/31/2022 0.80  0.6 - 1.0 MG/DL Final    GFR  08/31/2022 >60  >60 ml/min/1.73m2 Final    GFR Non- 08/31/2022 >60  >60 ml/min/1.73m2 Final    Comment:      Estimated GFR is calculated using the Modification of Diet in Renal Disease (MDRD) Study equation, reported for both  Americans (GFRAA) and non- Americans (GFRNA), and normalized to 1.73m2 body surface area. The physician must decide which value applies to the patient. The MDRD study equation should only be used in individuals age 25 or older. It has not been validated for the following: pregnant women, patients with serious comorbid conditions,or on certain medications, or persons with extremes of body size, muscle mass, or nutritional status. Calcium 08/31/2022 9.8  8.3 - 10.4 MG/DL Final    Total Bilirubin 08/31/2022 0.6  0.2 - 1.1 MG/DL Final    ALT 08/31/2022 56  12 - 65 U/L Final    AST 08/31/2022 47 (A) 15 - 37 U/L Final    Alk Phosphatase 08/31/2022 113  50 - 136 U/L Final    Total Protein 08/31/2022 8.0  6.3 - 8.2 g/dL Final    Albumin 08/31/2022 4.5  3.2 - 4.6 g/dL Final    Globulin 08/31/2022 3.5  2.3 - 3.5 g/dL Final    Albumin/Globulin Ratio 08/31/2022 1.3  1.2 - 3.5   Final        Treatment Summary has been discussed and given to patient: n/a        -------------------------------------------------------------------------------------------------------------------  Please call our office at [phone] if you have any  of the following symptoms:   Fever of 100.5 or greater  Chills  Shortness of breath  Swelling or pain in one leg    After office hours an answering service is available and will contact a provider for emergencies or if you are experiencing any of the above symptoms. Patient did express an interest in My Chart.   My Chart log in information explained on the after visit summary printout at the check-out geraldo.    Autumn Stockton RN

## 2023-03-02 ENCOUNTER — TELEPHONE (OUTPATIENT)
Dept: ONCOLOGY | Age: 63
End: 2023-03-02

## 2023-05-02 ASSESSMENT — ENCOUNTER SYMPTOMS
CONSTIPATION: 0
CHEST TIGHTNESS: 0
BLOOD IN STOOL: 0
WHEEZING: 0
ABDOMINAL DISTENTION: 0
VOICE CHANGE: 0
DIARRHEA: 0
NAUSEA: 0
SORE THROAT: 0
HEMOPTYSIS: 0
SHORTNESS OF BREATH: 0
SCLERAL ICTERUS: 0
TROUBLE SWALLOWING: 0
VOMITING: 0
ABDOMINAL PAIN: 0

## 2023-05-02 NOTE — PROGRESS NOTES
Mono # 0.4 0.1 - 1.3 K/UL    Absolute Eos # 0.2 0.0 - 0.8 K/UL    Basophils Absolute 0.0 0.0 - 0.2 K/UL    Absolute Immature Granulocyte 0.0 0.0 - 0.5 K/UL       Imaging: reviewed      PATHOLOGY:                                            ASSESSMENT:     Diagnosis Orders   1. Malignant neoplasm of right female breast, unspecified estrogen receptor status, unspecified site of breast ([facility] [address]        2. Ductal carcinoma in situ (DCIS) of right breast  CBC with Auto Differential    Comprehensive Metabolic Panel         1. Right breast - micro deposit of IDC, too small to run ER/OR/Her2, s/p bilateral mastectomies with residual DCIS, ER99/PR97%, intermediate grade, 11mm, 0/2 LN    - she declined genetic testing and will let us know if she's interested in the future     -Today, she is here for follow-up. She is doing well. No breast concerns. Status post breast recon - looking for someone to complete 3 D tattooing. No abnormalities noted on examination today.  -Surveillance discussed. She is recommended to return for follow-up in 6 months. - imaging surveillance will be symptoms driven. - reviewed how to perform SBE   - AST mildly increased - she does not drink alcohol; increased fluid intake; resolved on todayy's labs        RESUSCITATION DIRECTIVES/HOSPICE CARE: Full Support     RTC 6mo or sooner as needed       MDM     Amount and/or Complexity of Data Reviewed  Clinical lab tests: ordered and reviewed  Obtain history from someone other than the patient: yes  Review and summarize past medical records: yes  Independent visualization of images, tracings, or specimens: yes    Risk of Complications, Morbidity, and/or Mortality  Presenting problems: moderate  Diagnostic procedures: low  Management options: low        Lab studies and imaging studies were personally reviewed. Pertinent old records were reviewed.      Historical:   - During today's visit we discussed the pathophysiology of breast cancer, staging, and the

## 2023-05-04 ENCOUNTER — OFFICE VISIT (OUTPATIENT)
Dept: ONCOLOGY | Age: 63
End: 2023-05-04
Payer: COMMERCIAL

## 2023-05-04 ENCOUNTER — HOSPITAL ENCOUNTER (OUTPATIENT)
Dept: LAB | Age: 63
Discharge: HOME | End: 2023-05-04
Payer: COMMERCIAL

## 2023-05-04 VITALS
RESPIRATION RATE: 16 BRPM | HEIGHT: 66 IN | SYSTOLIC BLOOD PRESSURE: 117 MMHG | WEIGHT: 138.5 LBS | BODY MASS INDEX: 22.26 KG/M2 | OXYGEN SATURATION: 97 % | DIASTOLIC BLOOD PRESSURE: 77 MMHG | TEMPERATURE: 97.9 F | HEART RATE: 57 BPM

## 2023-05-04 DIAGNOSIS — D05.11 DUCTAL CARCINOMA IN SITU (DCIS) OF RIGHT BREAST: ICD-10-CM

## 2023-05-04 DIAGNOSIS — C50.911 MALIGNANT NEOPLASM OF RIGHT FEMALE BREAST, UNSPECIFIED ESTROGEN RECEPTOR STATUS, UNSPECIFIED SITE OF BREAST (HCC): Primary | ICD-10-CM

## 2023-05-04 LAB
ALBUMIN SERPL-MCNC: 4.1 G/DL (ref 3.2–4.6)
ALBUMIN/GLOB SERPL: 1.2 (ref 0.4–1.6)
ALP SERPL-CCNC: 110 U/L (ref 50–136)
ALT SERPL-CCNC: 24 U/L (ref 12–65)
ANION GAP SERPL CALC-SCNC: 1 MMOL/L (ref 2–11)
AST SERPL-CCNC: 21 U/L (ref 15–37)
BASOPHILS # BLD: 0 K/UL (ref 0–0.2)
BASOPHILS NFR BLD: 1 % (ref 0–2)
BILIRUB SERPL-MCNC: 0.3 MG/DL (ref 0.2–1.1)
BUN SERPL-MCNC: 18 MG/DL (ref 8–23)
CALCIUM SERPL-MCNC: 9.6 MG/DL (ref 8.3–10.4)
CHLORIDE SERPL-SCNC: 108 MMOL/L (ref 101–110)
CO2 SERPL-SCNC: 32 MMOL/L (ref 21–32)
CREAT SERPL-MCNC: 0.7 MG/DL (ref 0.6–1)
DIFFERENTIAL METHOD BLD: NORMAL
EOSINOPHIL # BLD: 0.2 K/UL (ref 0–0.8)
EOSINOPHIL NFR BLD: 3 % (ref 0.5–7.8)
ERYTHROCYTE [DISTWIDTH] IN BLOOD BY AUTOMATED COUNT: 13.2 % (ref 11.9–14.6)
GLOBULIN SER CALC-MCNC: 3.3 G/DL (ref 2.8–4.5)
GLUCOSE SERPL-MCNC: 93 MG/DL (ref 65–100)
HCT VFR BLD AUTO: 40.3 % (ref 35.8–46.3)
HGB BLD-MCNC: 13.4 G/DL (ref 11.7–15.4)
IMM GRANULOCYTES # BLD AUTO: 0 K/UL (ref 0–0.5)
IMM GRANULOCYTES NFR BLD AUTO: 0 % (ref 0–5)
LYMPHOCYTES # BLD: 1.7 K/UL (ref 0.5–4.6)
LYMPHOCYTES NFR BLD: 29 % (ref 13–44)
MCH RBC QN AUTO: 31.4 PG (ref 26.1–32.9)
MCHC RBC AUTO-ENTMCNC: 33.3 G/DL (ref 31.4–35)
MCV RBC AUTO: 94.4 FL (ref 82–102)
MONOCYTES # BLD: 0.4 K/UL (ref 0.1–1.3)
MONOCYTES NFR BLD: 7 % (ref 4–12)
NEUTS SEG # BLD: 3.6 K/UL (ref 1.7–8.2)
NEUTS SEG NFR BLD: 60 % (ref 43–78)
NRBC # BLD: 0 K/UL (ref 0–0.2)
PLATELET # BLD AUTO: 232 K/UL (ref 150–450)
PMV BLD AUTO: 9.6 FL (ref 9.4–12.3)
POTASSIUM SERPL-SCNC: 4.6 MMOL/L (ref 3.5–5.1)
PROT SERPL-MCNC: 7.4 G/DL (ref 6.3–8.2)
RBC # BLD AUTO: 4.27 M/UL (ref 4.05–5.2)
SODIUM SERPL-SCNC: 141 MMOL/L (ref 133–143)
WBC # BLD AUTO: 6 K/UL (ref 4.3–11.1)

## 2023-05-04 PROCEDURE — 80053 COMPREHEN METABOLIC PANEL: CPT

## 2023-05-04 PROCEDURE — 1036F TOBACCO NON-USER: CPT | Performed by: INTERNAL MEDICINE

## 2023-05-04 PROCEDURE — 36415 COLL VENOUS BLD VENIPUNCTURE: CPT

## 2023-05-04 PROCEDURE — 3017F COLORECTAL CA SCREEN DOC REV: CPT | Performed by: INTERNAL MEDICINE

## 2023-05-04 PROCEDURE — 99213 OFFICE O/P EST LOW 20 MIN: CPT | Performed by: INTERNAL MEDICINE

## 2023-05-04 PROCEDURE — G8420 CALC BMI NORM PARAMETERS: HCPCS | Performed by: INTERNAL MEDICINE

## 2023-05-04 PROCEDURE — G8427 DOCREV CUR MEDS BY ELIG CLIN: HCPCS | Performed by: INTERNAL MEDICINE

## 2023-05-04 PROCEDURE — 85025 COMPLETE CBC W/AUTO DIFF WBC: CPT

## 2023-05-04 ASSESSMENT — PATIENT HEALTH QUESTIONNAIRE - PHQ9
SUM OF ALL RESPONSES TO PHQ QUESTIONS 1-9: 0
SUM OF ALL RESPONSES TO PHQ9 QUESTIONS 1 & 2: 0
2. FEELING DOWN, DEPRESSED OR HOPELESS: 0
SUM OF ALL RESPONSES TO PHQ QUESTIONS 1-9: 0
1. LITTLE INTEREST OR PLEASURE IN DOING THINGS: 0

## 2023-05-04 NOTE — PATIENT INSTRUCTIONS
Patient Instructions from Today's Visit    Reason for Visit:  Follow Up    Diagnosis Information:  [email or web address]    Plan:  Labs reviewed.     Follow Up:  6 months    Recent Lab Results:  Hospital Outpatient Visit on 05/04/2023   Component Date Value Ref Range Status    WBC 05/04/2023 6.0  4.3 - 11.1 K/uL Final    RBC 05/04/2023 4.27  4.05 - 5.2 M/uL Final    Hemoglobin 05/04/2023 13.4  11.7 - 15.4 g/dL Final    Hematocrit 05/04/2023 40.3  35.8 - 46.3 % Final    MCV 05/04/2023 94.4  82.0 - 102.0 FL Final    MCH 05/04/2023 31.4  26.1 - 32.9 PG Final    MCHC 05/04/2023 33.3  31.4 - 35.0 g/dL Final    RDW 05/04/2023 13.2  11.9 - 14.6 % Final    Platelets 52/20/4769 232  150 - 450 K/uL Final    MPV 05/04/2023 9.6  9.4 - 12.3 FL Final    nRBC 05/04/2023 0.00  0.0 - 0.2 K/uL Final    **Note: Absolute NRBC parameter is now reported with Hemogram**    Differential Type 05/04/2023 AUTOMATED    Final    Seg Neutrophils 05/04/2023 60  43 - 78 % Final    Lymphocytes 05/04/2023 29  13 - 44 % Final    Monocytes 05/04/2023 7  4.0 - 12.0 % Final    Eosinophils % 05/04/2023 3  0.5 - 7.8 % Final    Basophils 05/04/2023 1  0.0 - 2.0 % Final    Immature Granulocytes 05/04/2023 0  0.0 - 5.0 % Final    Segs Absolute 05/04/2023 3.6  1.7 - 8.2 K/UL Final    Absolute Lymph # 05/04/2023 1.7  0.5 - 4.6 K/UL Final    Absolute Mono # 05/04/2023 0.4  0.1 - 1.3 K/UL Final    Absolute Eos # 05/04/2023 0.2  0.0 - 0.8 K/UL Final    Basophils Absolute 05/04/2023 0.0  0.0 - 0.2 K/UL Final    Absolute Immature Granulocyte 05/04/2023 0.0  0.0 - 0.5 K/UL Final         Treatment Summary has been discussed and given to patient: n/a        -------------------------------------------------------------------------------------------------------------------  Please call our office at [phone] if you have any  of the following symptoms:   Fever of 100.5 or

## 2023-09-26 ENCOUNTER — TRANSCRIBE ORDERS (OUTPATIENT)
Dept: SCHEDULING | Age: 63
End: 2023-09-26

## 2023-09-26 DIAGNOSIS — Z78.0 ASYMPTOMATIC MENOPAUSE: Primary | ICD-10-CM

## 2023-11-14 ASSESSMENT — ENCOUNTER SYMPTOMS
CONSTIPATION: 0
DIARRHEA: 0
TROUBLE SWALLOWING: 0
NAUSEA: 0
VOICE CHANGE: 0
SCLERAL ICTERUS: 0
VOMITING: 0
SHORTNESS OF BREATH: 0
SORE THROAT: 0
WHEEZING: 0
ABDOMINAL DISTENTION: 0
BLOOD IN STOOL: 0
CHEST TIGHTNESS: 0
ABDOMINAL PAIN: 0
HEMOPTYSIS: 0

## 2023-11-14 NOTE — PROGRESS NOTES
yes    Risk of Complications, Morbidity, and/or Mortality  Presenting problems: low  Diagnostic procedures: low  Management options: low      Lab studies and imaging studies were personally reviewed. Pertinent old records were reviewed. Historical:   - During today's visit we discussed the pathophysiology of breast cancer, staging, and the importance of receptor status in terms of treatment options. We then reviewed her medical history as well  as oncologic history, recent imaging and pathology in detail. The invasive component was ~1.5mm and too small to run receptor status per lab. Rest of disease at time of mastectomy was DCIS per above. Patients with DCIS undergo local treatment with  either mastectomy or BCT followed by adjuvant radiation (unless low-risk disease). The decision to administer endocrine therapy for risk reduction of subsequent cancers depends on choice of local therapy and receptor status. Five years of endocrine  therapy is offered to women with ER/DE positive disease who undergo unilateral mastectomy or BCT. There is no role for adjuvant endocrine  therapy for women who undergo bilateral mastectomies. We then reviewed the data behind relative efficacy of mastectomy vs BCT briefly, as she discussed this with surgery. Mastectomy is curative in over 98% of pts with DCIS, with disease recurrence being  rare at 1-2%. Some women choose contralateral prophylactic mastectomy to prevent future breast cancer in contralateral breast.  There is no proven survival benefit. The primary role of systemic therapy is risk reduction of invasive breast cancer in  ipsi/contralateral breast.  Chemotherapy plays no role in management of DCIS. About 50-75% of DCIS lesions express ER/DE. Tamoxifen is approved for adjuvant therapy to prevent invasive breast cancer recurrence in women with DCIS, but AI is also an acceptable  option.    We did discuss option of prophylactic endocrine therapy but she

## 2023-11-15 ENCOUNTER — HOSPITAL ENCOUNTER (OUTPATIENT)
Dept: LAB | Age: 63
Discharge: HOME | End: 2023-11-18
Payer: COMMERCIAL

## 2023-11-15 ENCOUNTER — OFFICE VISIT (OUTPATIENT)
Dept: ONCOLOGY | Age: 63
End: 2023-11-15
Payer: COMMERCIAL

## 2023-11-15 VITALS
SYSTOLIC BLOOD PRESSURE: 121 MMHG | WEIGHT: 135 LBS | TEMPERATURE: 98.4 F | RESPIRATION RATE: 16 BRPM | HEIGHT: 66 IN | HEART RATE: 75 BPM | BODY MASS INDEX: 21.69 KG/M2 | OXYGEN SATURATION: 97 % | DIASTOLIC BLOOD PRESSURE: 72 MMHG

## 2023-11-15 DIAGNOSIS — D05.11 DUCTAL CARCINOMA IN SITU (DCIS) OF RIGHT BREAST: ICD-10-CM

## 2023-11-15 DIAGNOSIS — C50.911 MALIGNANT NEOPLASM OF RIGHT FEMALE BREAST, UNSPECIFIED ESTROGEN RECEPTOR STATUS, UNSPECIFIED SITE OF BREAST (HCC): Primary | ICD-10-CM

## 2023-11-15 LAB
ALBUMIN SERPL-MCNC: 4.1 G/DL (ref 3.2–4.6)
ALBUMIN/GLOB SERPL: 1.1 (ref 0.4–1.6)
ALP SERPL-CCNC: 106 U/L (ref 50–136)
ALT SERPL-CCNC: 21 U/L (ref 12–65)
ANION GAP SERPL CALC-SCNC: 4 MMOL/L (ref 2–11)
AST SERPL-CCNC: 19 U/L (ref 15–37)
BASOPHILS # BLD: 0.1 K/UL (ref 0–0.2)
BASOPHILS NFR BLD: 1 % (ref 0–2)
BILIRUB SERPL-MCNC: 0.3 MG/DL (ref 0.2–1.1)
BUN SERPL-MCNC: 15 MG/DL (ref 8–23)
CALCIUM SERPL-MCNC: 9.7 MG/DL (ref 8.3–10.4)
CHLORIDE SERPL-SCNC: 105 MMOL/L (ref 101–110)
CO2 SERPL-SCNC: 31 MMOL/L (ref 21–32)
CREAT SERPL-MCNC: 0.8 MG/DL (ref 0.6–1)
DIFFERENTIAL METHOD BLD: NORMAL
EOSINOPHIL # BLD: 0.2 K/UL (ref 0–0.8)
EOSINOPHIL NFR BLD: 3 % (ref 0.5–7.8)
ERYTHROCYTE [DISTWIDTH] IN BLOOD BY AUTOMATED COUNT: 12.8 % (ref 11.9–14.6)
GLOBULIN SER CALC-MCNC: 3.7 G/DL (ref 2.8–4.5)
GLUCOSE SERPL-MCNC: 100 MG/DL (ref 65–100)
HCT VFR BLD AUTO: 42.4 % (ref 35.8–46.3)
HGB BLD-MCNC: 13.8 G/DL (ref 11.7–15.4)
IMM GRANULOCYTES # BLD AUTO: 0 K/UL (ref 0–0.5)
IMM GRANULOCYTES NFR BLD AUTO: 0 % (ref 0–5)
LYMPHOCYTES # BLD: 1.7 K/UL (ref 0.5–4.6)
LYMPHOCYTES NFR BLD: 25 % (ref 13–44)
MCH RBC QN AUTO: 31 PG (ref 26.1–32.9)
MCHC RBC AUTO-ENTMCNC: 32.5 G/DL (ref 31.4–35)
MCV RBC AUTO: 95.3 FL (ref 82–102)
MONOCYTES # BLD: 0.4 K/UL (ref 0.1–1.3)
MONOCYTES NFR BLD: 6 % (ref 4–12)
NEUTS SEG # BLD: 4.3 K/UL (ref 1.7–8.2)
NEUTS SEG NFR BLD: 65 % (ref 43–78)
NRBC # BLD: 0 K/UL (ref 0–0.2)
PLATELET # BLD AUTO: 236 K/UL (ref 150–450)
PMV BLD AUTO: 9.8 FL (ref 9.4–12.3)
POTASSIUM SERPL-SCNC: 4 MMOL/L (ref 3.5–5.1)
PROT SERPL-MCNC: 7.8 G/DL (ref 6.3–8.2)
RBC # BLD AUTO: 4.45 M/UL (ref 4.05–5.2)
SODIUM SERPL-SCNC: 140 MMOL/L (ref 133–143)
WBC # BLD AUTO: 6.6 K/UL (ref 4.3–11.1)

## 2023-11-15 PROCEDURE — 80053 COMPREHEN METABOLIC PANEL: CPT

## 2023-11-15 PROCEDURE — 85025 COMPLETE CBC W/AUTO DIFF WBC: CPT

## 2023-11-15 PROCEDURE — 99213 OFFICE O/P EST LOW 20 MIN: CPT | Performed by: INTERNAL MEDICINE

## 2023-11-15 PROCEDURE — G8420 CALC BMI NORM PARAMETERS: HCPCS | Performed by: INTERNAL MEDICINE

## 2023-11-15 PROCEDURE — 3017F COLORECTAL CA SCREEN DOC REV: CPT | Performed by: INTERNAL MEDICINE

## 2023-11-15 PROCEDURE — G8427 DOCREV CUR MEDS BY ELIG CLIN: HCPCS | Performed by: INTERNAL MEDICINE

## 2023-11-15 PROCEDURE — G8484 FLU IMMUNIZE NO ADMIN: HCPCS | Performed by: INTERNAL MEDICINE

## 2023-11-15 PROCEDURE — 36415 COLL VENOUS BLD VENIPUNCTURE: CPT

## 2023-11-15 PROCEDURE — 1036F TOBACCO NON-USER: CPT | Performed by: INTERNAL MEDICINE

## 2023-11-15 ASSESSMENT — PATIENT HEALTH QUESTIONNAIRE - PHQ9
SUM OF ALL RESPONSES TO PHQ9 QUESTIONS 1 & 2: 0
SUM OF ALL RESPONSES TO PHQ QUESTIONS 1-9: 0
SUM OF ALL RESPONSES TO PHQ QUESTIONS 1-9: 0
1. LITTLE INTEREST OR PLEASURE IN DOING THINGS: 0
SUM OF ALL RESPONSES TO PHQ QUESTIONS 1-9: 0
2. FEELING DOWN, DEPRESSED OR HOPELESS: 0
SUM OF ALL RESPONSES TO PHQ QUESTIONS 1-9: 0

## 2023-11-15 NOTE — PATIENT INSTRUCTIONS
Patient Instructions from Today's Visit    Reason for Visit:  Follow Up    Diagnosis Information:  [email or web address].[email or web address] [email or web address]-us/asco-answers-patient-education-materials/[email or web address]-jfrnhzm-[email or web address]-sheets    Plan:  Labs reviewed. Symptoms reviewed. Follow Up:  6 months.     Recent Lab Results:  Hospital Outpatient Visit on 11/15/2023   Component Date Value Ref Range Status    WBC 11/15/2023 6.6  4.3 - 11.1 K/uL Final    RBC 11/15/2023 4.45  4.05 - 5.2 M/uL Final    Hemoglobin 11/15/2023 13.8  11.7 - 15.4 g/dL Final    Hematocrit 11/15/2023 42.4  35.8 - 46.3 % Final    MCV 11/15/2023 95.3  82.0 - 102.0 FL Final    MCH 11/15/2023 31.0  26.1 - 32.9 PG Final    MCHC 11/15/2023 32.5  31.4 - 35.0 g/dL Final    RDW 11/15/2023 12.8  11.9 - 14.6 % Final    Platelets 96/54/6327 236  150 - 450 K/uL Final    MPV 11/15/2023 9.8  9.4 - 12.3 FL Final    nRBC 11/15/2023 0.00  0.0 - 0.2 K/uL Final    **Note: Absolute NRBC parameter is now reported with Hemogram**    Neutrophils % 11/15/2023 65  43 - 78 % Final    Lymphocytes % 11/15/2023 25  13 - 44 % Final    Monocytes % 11/15/2023 6  4.0 - 12.0 % Final    Eosinophils % 11/15/2023 3  0.5 - 7.8 % Final    Basophils % 11/15/2023 1  0.0 - 2.0 % Final    Immature Granulocytes 11/15/2023 0  0.0 - 5.0 % Final    Neutrophils Absolute 11/15/2023 4.3  1.7 - 8.2 K/UL Final    Lymphocytes Absolute 11/15/2023 1.7  0.5 - 4.6 K/UL Final    Monocytes Absolute 11/15/2023 0.4  0.1 - 1.3 K/UL Final    Eosinophils Absolute 11/15/2023 0.2  0.0 - 0.8 K/UL Final    Basophils Absolute 11/15/2023 0.1  0.0 - 0.2 K/UL Final    Absolute Immature Granulocyte 11/15/2023 0.0  0.0 - 0.5 K/UL Final    Differential Type 11/15/2023 AUTOMATED    Final    Sodium 11/15/2023 140  133 - 143 mmol/L Final    Potassium 11/15/2023 4.0  3.5 - 5.1 mmol/L Final    Chloride 11/15/2023 105  101 - 110 mmol/L Final    CO2 11/15/2023 31  21 - 32 mmol/L Final    Anion Gap 11/15/2023 4  2 - 11 mmol/L Final    Glucose 11/15/2023 100  65

## 2024-02-14 ENCOUNTER — HOSPITAL ENCOUNTER (OUTPATIENT)
Dept: MAMMOGRAPHY | Age: 64
Discharge: HOME | End: 2024-02-17
Payer: COMMERCIAL

## 2024-02-14 DIAGNOSIS — Z78.0 ASYMPTOMATIC MENOPAUSE: ICD-10-CM

## 2024-02-14 PROCEDURE — 77080 DXA BONE DENSITY AXIAL: CPT

## 2024-05-09 DIAGNOSIS — C50.911 MALIGNANT NEOPLASM OF RIGHT FEMALE BREAST, UNSPECIFIED ESTROGEN RECEPTOR STATUS, UNSPECIFIED SITE OF BREAST (HCC): Primary | ICD-10-CM

## 2024-05-17 ENCOUNTER — HOSPITAL ENCOUNTER (OUTPATIENT)
Dept: LAB | Age: 64
Discharge: HOME | End: 2024-05-17
Payer: COMMERCIAL

## 2024-05-17 ENCOUNTER — OFFICE VISIT (OUTPATIENT)
Dept: ONCOLOGY | Age: 64
End: 2024-05-17
Payer: COMMERCIAL

## 2024-05-17 VITALS
HEIGHT: 66 IN | OXYGEN SATURATION: 95 % | DIASTOLIC BLOOD PRESSURE: 72 MMHG | RESPIRATION RATE: 16 BRPM | WEIGHT: 134.3 LBS | HEART RATE: 56 BPM | BODY MASS INDEX: 21.58 KG/M2 | SYSTOLIC BLOOD PRESSURE: 117 MMHG | TEMPERATURE: 98.2 F

## 2024-05-17 DIAGNOSIS — C50.911 MALIGNANT NEOPLASM OF RIGHT FEMALE BREAST, UNSPECIFIED ESTROGEN RECEPTOR STATUS, UNSPECIFIED SITE OF BREAST (HCC): ICD-10-CM

## 2024-05-17 DIAGNOSIS — C50.911 MALIGNANT NEOPLASM OF RIGHT FEMALE BREAST, UNSPECIFIED ESTROGEN RECEPTOR STATUS, UNSPECIFIED SITE OF BREAST (HCC): Primary | ICD-10-CM

## 2024-05-17 LAB
ALBUMIN SERPL-MCNC: 4.3 G/DL (ref 3.2–4.6)
ALBUMIN/GLOB SERPL: 1.5 (ref 1–1.9)
ALP SERPL-CCNC: 100 U/L (ref 35–104)
ALT SERPL-CCNC: 20 U/L (ref 12–65)
ANION GAP SERPL CALC-SCNC: 9 MMOL/L (ref 9–18)
AST SERPL-CCNC: 28 U/L (ref 15–37)
BASOPHILS # BLD: 0 K/UL (ref 0–0.2)
BASOPHILS NFR BLD: 1 % (ref 0–2)
BILIRUB SERPL-MCNC: 0.4 MG/DL (ref 0–1.2)
BUN SERPL-MCNC: 14 MG/DL (ref 8–23)
CALCIUM SERPL-MCNC: 9.9 MG/DL (ref 8.8–10.2)
CHLORIDE SERPL-SCNC: 99 MMOL/L (ref 98–107)
CO2 SERPL-SCNC: 32 MMOL/L (ref 20–28)
CREAT SERPL-MCNC: 0.68 MG/DL (ref 0.6–1.1)
DIFFERENTIAL METHOD BLD: NORMAL
EOSINOPHIL # BLD: 0.1 K/UL (ref 0–0.8)
EOSINOPHIL NFR BLD: 2 % (ref 0.5–7.8)
ERYTHROCYTE [DISTWIDTH] IN BLOOD BY AUTOMATED COUNT: 13.1 % (ref 11.9–14.6)
GLOBULIN SER CALC-MCNC: 2.8 G/DL (ref 2.3–3.5)
GLUCOSE SERPL-MCNC: 107 MG/DL (ref 70–99)
HCT VFR BLD AUTO: 41.5 % (ref 35.8–46.3)
HGB BLD-MCNC: 13.9 G/DL (ref 11.7–15.4)
IMM GRANULOCYTES # BLD AUTO: 0 K/UL (ref 0–0.5)
IMM GRANULOCYTES NFR BLD AUTO: 0 % (ref 0–5)
LYMPHOCYTES # BLD: 2.3 K/UL (ref 0.5–4.6)
LYMPHOCYTES NFR BLD: 42 % (ref 13–44)
MCH RBC QN AUTO: 31.5 PG (ref 26.1–32.9)
MCHC RBC AUTO-ENTMCNC: 33.5 G/DL (ref 31.4–35)
MCV RBC AUTO: 94.1 FL (ref 82–102)
MONOCYTES # BLD: 0.4 K/UL (ref 0.1–1.3)
MONOCYTES NFR BLD: 7 % (ref 4–12)
NEUTS SEG # BLD: 2.6 K/UL (ref 1.7–8.2)
NEUTS SEG NFR BLD: 48 % (ref 43–78)
NRBC # BLD: 0 K/UL (ref 0–0.2)
PLATELET # BLD AUTO: 241 K/UL (ref 150–450)
PMV BLD AUTO: 9.8 FL (ref 9.4–12.3)
POTASSIUM SERPL-SCNC: 4.5 MMOL/L (ref 3.5–5.1)
PROT SERPL-MCNC: 7.2 G/DL (ref 6.3–8.2)
RBC # BLD AUTO: 4.41 M/UL (ref 4.05–5.2)
SODIUM SERPL-SCNC: 140 MMOL/L (ref 136–145)
WBC # BLD AUTO: 5.4 K/UL (ref 4.3–11.1)

## 2024-05-17 PROCEDURE — 1036F TOBACCO NON-USER: CPT

## 2024-05-17 PROCEDURE — G8427 DOCREV CUR MEDS BY ELIG CLIN: HCPCS

## 2024-05-17 PROCEDURE — 36415 COLL VENOUS BLD VENIPUNCTURE: CPT

## 2024-05-17 PROCEDURE — G8420 CALC BMI NORM PARAMETERS: HCPCS

## 2024-05-17 PROCEDURE — 85025 COMPLETE CBC W/AUTO DIFF WBC: CPT

## 2024-05-17 PROCEDURE — 80053 COMPREHEN METABOLIC PANEL: CPT

## 2024-05-17 PROCEDURE — 99214 OFFICE O/P EST MOD 30 MIN: CPT

## 2024-05-17 PROCEDURE — 3017F COLORECTAL CA SCREEN DOC REV: CPT

## 2024-05-17 RX ORDER — ERYTHROMYCIN 20 MG/ML
SOLUTION TOPICAL
COMMUNITY
Start: 2024-04-18

## 2024-05-17 RX ORDER — KETOCONAZOLE 20 MG/ML
SHAMPOO TOPICAL
COMMUNITY

## 2024-05-17 ASSESSMENT — PATIENT HEALTH QUESTIONNAIRE - PHQ9
2. FEELING DOWN, DEPRESSED OR HOPELESS: NOT AT ALL
SUM OF ALL RESPONSES TO PHQ QUESTIONS 1-9: 0
SUM OF ALL RESPONSES TO PHQ9 QUESTIONS 1 & 2: 0
SUM OF ALL RESPONSES TO PHQ QUESTIONS 1-9: 0
SUM OF ALL RESPONSES TO PHQ QUESTIONS 1-9: 0
1. LITTLE INTEREST OR PLEASURE IN DOING THINGS: NOT AT ALL
SUM OF ALL RESPONSES TO PHQ QUESTIONS 1-9: 0

## 2024-05-17 NOTE — PROGRESS NOTES
[facility] Hematology and Oncology: Established patient - follow up     Chief Complaint   Patient presents with    Follow-up      Reason for Referral: Ductal carcinoma of breast, right; Developmental breast asymmetry; Family history of breast  cancer   Referring Provider:  Viet Rogers MD   Primary Care Provider: Byron Wang NP   Family History of Cancer/Hematologic Disorders: Mother and sister with breast cancer   Presenting Symptoms: abnormal routine screening mammogram    History of Present Illness:  Ms. Flores is a 64 y.o. female who presents today for FU regarding breast cancer.  The past medical history is significant for menopause, hypothyroidism, IBS and [place].  Sxhx: breast biopsy, bilateral mastectomy and breast reconstruction.  In June 2021, Ms. Flores presented for her routine screening mammogram.  She does note a family history of breast cancer however she denies cancer herself.  Her 6/2021 imaging reported suspicious calcifications in the right breast . In 7/2021, she had a right breast digital mammogram that reported an increasing indeterminate calcification at the 12:00 position in the right breast.  On 7/30/21 she underwent a core needle  biopsy of right breast.  The pathology reported one biopsy as benign however the other biopsy reported invasive ductal carcinoma, jason  grade I/III with associated microcalcifications with low grade ductal carcinoma in situ, cribriform type.  The IHC staining of this specimen was unable to be performed secondary to no residual tumor  in specimen.  On 8/26/21, Ms. Flores had a bilateral breast MRI that reported a 2.2 cm enhancing lesion in the superior right breast consistent  with primary breast tumor and no evidence of further malignancy.  On 10/14/21, Ms. Flores underwent a bilateral breast mastectomy with right sentinel lymph node biopsy by Dr. Rogers .  The pathology reported left breast as fibrocystic mastopathy; adenosis; benign ductal

## 2025-05-06 DIAGNOSIS — C50.911 MALIGNANT NEOPLASM OF RIGHT FEMALE BREAST, UNSPECIFIED ESTROGEN RECEPTOR STATUS, UNSPECIFIED SITE OF BREAST (HCC): Primary | ICD-10-CM

## 2025-05-06 NOTE — PROGRESS NOTES
[facility] Hematology and Oncology: Established patient - follow up     Chief Complaint   Patient presents with    Follow-up      Reason for Referral: Ductal carcinoma of breast, right; Developmental breast asymmetry; Family history of breast  cancer   Referring Provider:  Viet Rogers MD   Primary Care Provider: Byron Wang NP   Family History of Cancer/Hematologic Disorders: Mother and sister with breast cancer   Presenting Symptoms: abnormal routine screening mammogram    History of Present Illness:  Ms. Flores is a 65 y.o. female who presents today for FU regarding breast cancer.  The past medical history is significant for menopause, hypothyroidism, IBS and [place].  Sxhx: breast biopsy, bilateral mastectomy and breast reconstruction.  In June 2021, Ms. Flores presented for her routine screening mammogram.  She does note a family history of breast cancer however she denies cancer herself.  Her 6/2021 imaging reported suspicious calcifications in the right breast . In 7/2021, she had a right breast digital mammogram that reported an increasing indeterminate calcification at the 12:00 position in the right breast.  On 7/30/21 she underwent a core needle  biopsy of right breast.  The pathology reported one biopsy as benign however the other biopsy reported invasive ductal carcinoma, jason  grade I/III with associated microcalcifications with low grade ductal carcinoma in situ, cribriform type.  The IHC staining of this specimen was unable to be performed secondary to no residual tumor  in specimen.  On 8/26/21, Ms. Flores had a bilateral breast MRI that reported a 2.2 cm enhancing lesion in the superior right breast consistent  with primary breast tumor and no evidence of further malignancy.  On 10/14/21, Ms. Flores underwent a bilateral breast mastectomy with right sentinel lymph node biopsy by Dr. Rogers .  The pathology reported left breast as fibrocystic mastopathy; adenosis; benign ductal

## 2025-05-14 ENCOUNTER — HOSPITAL ENCOUNTER (OUTPATIENT)
Dept: LAB | Age: 65
Discharge: HOME | End: 2025-05-14
Payer: COMMERCIAL

## 2025-05-14 ENCOUNTER — OFFICE VISIT (OUTPATIENT)
Dept: ONCOLOGY | Age: 65
End: 2025-05-14
Payer: COMMERCIAL

## 2025-05-14 VITALS
SYSTOLIC BLOOD PRESSURE: 125 MMHG | RESPIRATION RATE: 16 BRPM | HEIGHT: 66 IN | BODY MASS INDEX: 21.38 KG/M2 | WEIGHT: 133 LBS | DIASTOLIC BLOOD PRESSURE: 77 MMHG | TEMPERATURE: 98.4 F | HEART RATE: 76 BPM | OXYGEN SATURATION: 98 %

## 2025-05-14 DIAGNOSIS — C50.911 MALIGNANT NEOPLASM OF RIGHT FEMALE BREAST, UNSPECIFIED ESTROGEN RECEPTOR STATUS, UNSPECIFIED SITE OF BREAST (HCC): Primary | ICD-10-CM

## 2025-05-14 DIAGNOSIS — C50.911 MALIGNANT NEOPLASM OF RIGHT FEMALE BREAST, UNSPECIFIED ESTROGEN RECEPTOR STATUS, UNSPECIFIED SITE OF BREAST (HCC): ICD-10-CM

## 2025-05-14 DIAGNOSIS — R74.8 ELEVATED ALKALINE PHOSPHATASE LEVEL: ICD-10-CM

## 2025-05-14 LAB
ALBUMIN SERPL-MCNC: 4.4 G/DL (ref 3.2–4.6)
ALBUMIN/GLOB SERPL: 1.3 (ref 1–1.9)
ALP SERPL-CCNC: 107 U/L (ref 35–104)
ALT SERPL-CCNC: 22 U/L (ref 8–45)
ANION GAP SERPL CALC-SCNC: 10 MMOL/L (ref 7–16)
AST SERPL-CCNC: 27 U/L (ref 15–37)
BASOPHILS # BLD: 0.03 K/UL (ref 0–0.2)
BASOPHILS NFR BLD: 0.4 % (ref 0–2)
BILIRUB SERPL-MCNC: 0.4 MG/DL (ref 0–1.2)
BUN SERPL-MCNC: 18 MG/DL (ref 8–23)
CALCIUM SERPL-MCNC: 10.6 MG/DL (ref 8.8–10.2)
CHLORIDE SERPL-SCNC: 100 MMOL/L (ref 98–107)
CO2 SERPL-SCNC: 30 MMOL/L (ref 20–29)
CREAT SERPL-MCNC: 0.72 MG/DL (ref 0.6–1.1)
DIFFERENTIAL METHOD BLD: NORMAL
EOSINOPHIL # BLD: 0.16 K/UL (ref 0–0.8)
EOSINOPHIL NFR BLD: 2.1 % (ref 0.5–7.8)
ERYTHROCYTE [DISTWIDTH] IN BLOOD BY AUTOMATED COUNT: 12.7 % (ref 11.9–14.6)
GLOBULIN SER CALC-MCNC: 3.3 G/DL (ref 2.3–3.5)
GLUCOSE SERPL-MCNC: 99 MG/DL (ref 70–99)
HCT VFR BLD AUTO: 43.1 % (ref 35.8–46.3)
HGB BLD-MCNC: 14.2 G/DL (ref 11.7–15.4)
IMM GRANULOCYTES # BLD AUTO: 0.01 K/UL (ref 0–0.5)
IMM GRANULOCYTES NFR BLD AUTO: 0.1 % (ref 0–5)
LYMPHOCYTES # BLD: 2.05 K/UL (ref 0.5–4.6)
LYMPHOCYTES NFR BLD: 27.3 % (ref 13–44)
MCH RBC QN AUTO: 31 PG (ref 26.1–32.9)
MCHC RBC AUTO-ENTMCNC: 32.9 G/DL (ref 31.4–35)
MCV RBC AUTO: 94.1 FL (ref 82–102)
MONOCYTES # BLD: 0.43 K/UL (ref 0.1–1.3)
MONOCYTES NFR BLD: 5.7 % (ref 4–12)
NEUTS SEG # BLD: 4.84 K/UL (ref 1.7–8.2)
NEUTS SEG NFR BLD: 64.4 % (ref 43–78)
NRBC # BLD: 0 K/UL (ref 0–0.2)
PLATELET # BLD AUTO: 244 K/UL (ref 150–450)
PMV BLD AUTO: 9.7 FL (ref 9.4–12.3)
POTASSIUM SERPL-SCNC: 4.3 MMOL/L (ref 3.5–5.1)
PROT SERPL-MCNC: 7.7 G/DL (ref 6.3–8.2)
RBC # BLD AUTO: 4.58 M/UL (ref 4.05–5.2)
SODIUM SERPL-SCNC: 140 MMOL/L (ref 136–145)
WBC # BLD AUTO: 7.5 K/UL (ref 4.3–11.1)

## 2025-05-14 PROCEDURE — 80053 COMPREHEN METABOLIC PANEL: CPT

## 2025-05-14 PROCEDURE — 36415 COLL VENOUS BLD VENIPUNCTURE: CPT

## 2025-05-14 PROCEDURE — 85025 COMPLETE CBC W/AUTO DIFF WBC: CPT

## 2025-05-14 PROCEDURE — 99214 OFFICE O/P EST MOD 30 MIN: CPT | Performed by: INTERNAL MEDICINE

## 2025-05-14 ASSESSMENT — PATIENT HEALTH QUESTIONNAIRE - PHQ9
SUM OF ALL RESPONSES TO PHQ QUESTIONS 1-9: 0
SUM OF ALL RESPONSES TO PHQ QUESTIONS 1-9: 0
1. LITTLE INTEREST OR PLEASURE IN DOING THINGS: NOT AT ALL
SUM OF ALL RESPONSES TO PHQ QUESTIONS 1-9: 0
2. FEELING DOWN, DEPRESSED OR HOPELESS: NOT AT ALL
SUM OF ALL RESPONSES TO PHQ QUESTIONS 1-9: 0

## 2025-05-14 NOTE — PATIENT INSTRUCTIONS
Patient Information from Today's Visit    The members of your [facility] are listed below:    Physician Provider: Mackenzie Bright, Medical Oncologist  Registered Nurse: Margie CELESTIN RN  Navigator: Elvia HEARN RN or Kaylen MARTINEZ RN  Medical Assistant: Maritza HEADLEY MA  : Mary PACKER   Supportive Care Services: Fiordaliza PETIT LMSW    Diagnosis: DCIS      Follow Up Instructions: 6 months.    Labs reviewed.  Symptoms reviewed.  Follow up with your primary care provider regarding your alkaline phosphatase level and calcium levels.    Treatment Summary has been discussed and given to patient:N/A      Current Labs:   Hospital Outpatient Visit on 05/14/2025   Component Date Value Ref Range Status    WBC 05/14/2025 7.5  4.3 - 11.1 K/uL Final    RBC 05/14/2025 4.58  4.05 - 5.2 M/uL Final    Hemoglobin 05/14/2025 14.2  11.7 - 15.4 g/dL Final    Hematocrit 05/14/2025 43.1  35.8 - 46.3 % Final    MCV 05/14/2025 94.1  82.0 - 102.0 FL Final    MCH 05/14/2025 31.0  26.1 - 32.9 PG Final    MCHC 05/14/2025 32.9  31.4 - 35.0 g/dL Final    RDW 05/14/2025 12.7  11.9 - 14.6 % Final    Platelets 05/14/2025 244  150 - 450 K/uL Final    MPV 05/14/2025 9.7  9.4 - 12.3 FL Final    nRBC 05/14/2025 0.00  0.0 - 0.2 K/uL Final    **Note: Absolute NRBC parameter is now reported with Hemogram**    Neutrophils % 05/14/2025 64.4  43.0 - 78.0 % Final    Lymphocytes % 05/14/2025 27.3  13.0 - 44.0 % Final    Monocytes % 05/14/2025 5.7  4.0 - 12.0 % Final    Eosinophils % 05/14/2025 2.1  0.5 - 7.8 % Final    Basophils % 05/14/2025 0.4  0.0 - 2.0 % Final    Immature Granulocytes % 05/14/2025 0.1  0.0 - 5.0 % Final    Neutrophils Absolute 05/14/2025 4.84  1.70 - 8.20 K/UL Final    Lymphocytes Absolute 05/14/2025 2.05  0.50 - 4.60 K/UL Final    Monocytes Absolute 05/14/2025 0.43  0.10 - 1.30 K/UL Final    Eosinophils Absolute 05/14/2025 0.16  0.00 - 0.80 K/UL Final    Basophils Absolute 05/14/2025 0.03  0.00 - 0.20 K/UL Final    Immature

## 2025-08-28 ENCOUNTER — TELEPHONE (OUTPATIENT)
Dept: ONCOLOGY | Age: 65
End: 2025-08-28

## (undated) DEVICE — DRAPE,TOP,102X53,STERILE: Brand: MEDLINE

## (undated) DEVICE — AMD ANTIMICROBIAL GAUZE SPONGES,12 PLY USP TYPE VII, 0.2% POLYHEXAMETHYLENE BIGUANIDE HCI (PHMB): Brand: CURITY

## (undated) DEVICE — SUT ETHLN 4-0 18IN PS2 BLK --

## (undated) DEVICE — Device

## (undated) DEVICE — BUTTON SWITCH PENCIL BLADE ELECTRODE, HOLSTER: Brand: EDGE

## (undated) DEVICE — SUT VCRL + 3-0 27IN X1 VIO --

## (undated) DEVICE — DRAPE TWL SURG 16X26IN BLU ORB04] ALLCARE INC]

## (undated) DEVICE — UNIVERSAL DRAPES: Brand: MEDLINE INDUSTRIES, INC.

## (undated) DEVICE — REM POLYHESIVE ADULT PATIENT RETURN ELECTRODE: Brand: VALLEYLAB

## (undated) DEVICE — OCCLUSIVE GAUZE STRIP,3% BISMUTH TRIBROMOPHENATE IN PETROLATUM BLEND: Brand: XEROFORM

## (undated) DEVICE — SYSTEM IMPL DEL FOR BRST IMPL FUN (SEE COMMENT)

## (undated) DEVICE — DRAIN SURG 3/4 W10MMXL20CM 100% SIL PERF FLAT HUBLESS

## (undated) DEVICE — SUTURE ABSRB X-1 REV CUT 1/2 CIR 22MM UD BRAID 27IN SZ 3-0 J458H

## (undated) DEVICE — GARMENT,MEDLINE,DVT,INT,CALF,MED, GEN2: Brand: MEDLINE

## (undated) DEVICE — DRAPE,T,LAPARO,TRANS,STERILE: Brand: MEDLINE

## (undated) DEVICE — APPLIER CLP AUTO MED 9.75 IN TI SURGCLP SUPER INTLOK 20 DISP

## (undated) DEVICE — COVER US PRB W12XL244CM FLD IORT STR TIP

## (undated) DEVICE — SOLUTION IRRIG 1000ML 09% SOD CHL USP PIC PLAS CONTAINER

## (undated) DEVICE — PREP SKN CHLRAPRP APL 26ML STR --

## (undated) DEVICE — RESERVOIR,SUCTION,100CC,SILICONE: Brand: MEDLINE

## (undated) DEVICE — SPONGE LAP 18X18IN STRL -- 5/PK

## (undated) DEVICE — SINGLE BASIN: Brand: CARDINAL HEALTH

## (undated) DEVICE — BASIC SINGLE BASIN-LF: Brand: MEDLINE INDUSTRIES, INC.

## (undated) DEVICE — SUTURE VCRL SZ 2-0 L36IN ABSRB UD L36MM CT-1 1/2 CIR J945H

## (undated) DEVICE — BRA SURG POST-OP MED 38IN --

## (undated) DEVICE — SUTURE VCRL 2-0 L27IN ABSRB CT BRAID COAT UD J275H

## (undated) DEVICE — SOLUTION IV 1000ML 0.9% SOD CHL

## (undated) DEVICE — MINOR SPLIT GENERAL: Brand: MEDLINE INDUSTRIES, INC.

## (undated) DEVICE — BLADE ELECTRODE: Brand: EDGE

## (undated) DEVICE — GAUZE,SPONGE,8"X4",12PLY,XRAY,STRL,LF: Brand: MEDLINE

## (undated) DEVICE — SURGICAL PROCEDURE PACK BASIC ST FRANCIS

## (undated) DEVICE — BANDAGE,GAUZE,BULKEE II,4.5"X4.1YD,STRL: Brand: MEDLINE

## (undated) DEVICE — SHEET, DRAPE, SPLIT, STERILE: Brand: MEDLINE

## (undated) DEVICE — SUTURE MCRYL SZ 4-0 L27IN ABSRB UD L19MM PS-2 1/2 CIR PRIM Y426H

## (undated) DEVICE — BLADE ELECTRODE: Brand: VALLEYLAB

## (undated) DEVICE — TIP CLEANR ELECSURG 1.75X1.75 --

## (undated) DEVICE — SYR LR LCK 1ML GRAD NSAF 30ML --

## (undated) DEVICE — PUMP PAIN MGMT 400ML 4ML/HR 2 W/ SIL SOAK CATH FOR ABD

## (undated) DEVICE — APPLIER CLP LIG SM TI PREM SURGCLP SUPER INTLOK 20 DISP

## (undated) DEVICE — TRAY PREP DRY W/ PREM GLV 2 APPL 6 SPNG 2 UNDPD 1 OVERWRAP

## (undated) DEVICE — ADAPTER CIRC OD22XID15MM FOR MON VENT PARAMETER

## (undated) DEVICE — ABDOMINAL PAD: Brand: DERMACEA

## (undated) DEVICE — HEX-LOCKING BLADE ELECTRODE: Brand: EDGE

## (undated) DEVICE — ULTRA HIGH PROFILE, UH 455CC GEL SIZER
Type: IMPLANTABLE DEVICE | Site: BREAST | Status: NON-FUNCTIONAL
Brand: MENTOR MEMORYGEL RESTERILIZABLE GEL SIZER
Removed: 2022-03-09